# Patient Record
Sex: MALE | Employment: OTHER | URBAN - METROPOLITAN AREA
[De-identification: names, ages, dates, MRNs, and addresses within clinical notes are randomized per-mention and may not be internally consistent; named-entity substitution may affect disease eponyms.]

---

## 2023-03-06 PROBLEM — E55.9 VITAMIN D DEFICIENCY: Status: ACTIVE | Noted: 2023-03-06

## 2023-03-06 PROBLEM — B20 AIDS (MULTI): Status: ACTIVE | Noted: 2023-03-06

## 2023-03-06 PROBLEM — H52.203 ASTIGMATISM, BILATERAL: Status: ACTIVE | Noted: 2023-03-06

## 2023-03-06 PROBLEM — B20 HIV DISEASE (MULTI): Status: ACTIVE | Noted: 2023-03-06

## 2023-03-06 PROBLEM — R05.9 COUGH: Status: ACTIVE | Noted: 2023-03-06

## 2023-03-06 PROBLEM — I87.399 CHRONIC VENOUS HYPERTENSION WITH COMPLICATION: Status: ACTIVE | Noted: 2023-03-06

## 2023-03-06 PROBLEM — E66.01 MORBID OBESITY (MULTI): Status: ACTIVE | Noted: 2023-03-06

## 2023-03-06 PROBLEM — J30.9 ALLERGIC RHINITIS: Status: ACTIVE | Noted: 2023-03-06

## 2023-03-06 PROBLEM — J06.9 ACUTE URI: Status: ACTIVE | Noted: 2023-03-06

## 2023-03-06 PROBLEM — K74.60 CIRRHOSIS (MULTI): Status: ACTIVE | Noted: 2023-03-06

## 2023-03-06 PROBLEM — D66: Status: ACTIVE | Noted: 2023-03-06

## 2023-03-06 PROBLEM — M25.062 HEMARTHROSIS OF LEFT KNEE: Status: ACTIVE | Noted: 2023-03-06

## 2023-03-06 PROBLEM — K13.79 MOUTH BLEEDING: Status: ACTIVE | Noted: 2023-03-06

## 2023-03-06 PROBLEM — D69.9 BLEEDING DISORDER (CMS-HCC): Status: ACTIVE | Noted: 2023-03-06

## 2023-03-06 PROBLEM — L84 CALLUS OF FOOT: Status: ACTIVE | Noted: 2023-03-06

## 2023-03-06 PROBLEM — F72 SEVERE MENTAL HANDICAP: Status: ACTIVE | Noted: 2023-03-06

## 2023-03-06 PROBLEM — M25.562 LEFT KNEE PAIN: Status: ACTIVE | Noted: 2023-03-06

## 2023-03-06 PROBLEM — R56.9 CONVULSIONS (MULTI): Status: ACTIVE | Noted: 2023-03-06

## 2023-03-06 PROBLEM — H52.13 BILATERAL MYOPIA: Status: ACTIVE | Noted: 2023-03-06

## 2023-03-06 PROBLEM — M23.42 LOOSE BODY OF LEFT KNEE: Status: ACTIVE | Noted: 2023-03-06

## 2023-03-06 PROBLEM — Z86.39 HISTORY OF OBESITY: Status: ACTIVE | Noted: 2023-03-06

## 2023-03-06 PROBLEM — M19.90 DJD (DEGENERATIVE JOINT DISEASE): Status: ACTIVE | Noted: 2023-03-06

## 2023-03-06 PROBLEM — I10 BENIGN HYPERTENSION: Status: ACTIVE | Noted: 2023-03-06

## 2023-03-06 PROBLEM — H61.22 IMPACTED CERUMEN OF LEFT EAR: Status: ACTIVE | Noted: 2023-03-06

## 2023-03-06 RX ORDER — BICTEGRAVIR SODIUM, EMTRICITABINE, AND TENOFOVIR ALAFENAMIDE FUMARATE 50; 200; 25 MG/1; MG/1; MG/1
1 TABLET ORAL DAILY
COMMUNITY
Start: 2021-04-08 | End: 2024-01-25 | Stop reason: SDUPTHER

## 2023-03-06 RX ORDER — CELECOXIB 100 MG/1
1 CAPSULE ORAL 2 TIMES DAILY
COMMUNITY
Start: 2017-11-28 | End: 2024-01-10 | Stop reason: SDUPTHER

## 2023-03-06 RX ORDER — ACETAMINOPHEN 500 MG
2 TABLET ORAL DAILY
COMMUNITY
Start: 2019-11-21 | End: 2024-01-25 | Stop reason: SDUPTHER

## 2023-03-06 RX ORDER — LEVETIRACETAM 1000 MG/1
1 TABLET ORAL 2 TIMES DAILY
COMMUNITY
Start: 2016-01-06 | End: 2024-01-09

## 2023-03-30 ENCOUNTER — APPOINTMENT (OUTPATIENT)
Dept: PRIMARY CARE | Facility: CLINIC | Age: 43
End: 2023-03-30
Payer: MEDICARE

## 2023-03-30 LAB
ALANINE AMINOTRANSFERASE (SGPT) (U/L) IN SER/PLAS: 22 U/L (ref 10–52)
ALBUMIN (G/DL) IN SER/PLAS: 4 G/DL (ref 3.4–5)
ALKALINE PHOSPHATASE (U/L) IN SER/PLAS: 99 U/L (ref 33–120)
ANION GAP IN SER/PLAS: 13 MMOL/L (ref 10–20)
ASPARTATE AMINOTRANSFERASE (SGOT) (U/L) IN SER/PLAS: 22 U/L (ref 9–39)
BASOPHILS (10*3/UL) IN BLOOD BY AUTOMATED COUNT: 0.02 X10E9/L (ref 0–0.1)
BASOPHILS/100 LEUKOCYTES IN BLOOD BY AUTOMATED COUNT: 0.6 % (ref 0–2)
BILIRUBIN TOTAL (MG/DL) IN SER/PLAS: 1 MG/DL (ref 0–1.2)
CALCIDIOL (25 OH VITAMIN D3) (NG/ML) IN SER/PLAS: 64 NG/ML
CALCIUM (MG/DL) IN SER/PLAS: 9 MG/DL (ref 8.6–10.6)
CARBON DIOXIDE, TOTAL (MMOL/L) IN SER/PLAS: 26 MMOL/L (ref 21–32)
CD3+CD4+ ABSOLUTE: 0.32 X10E9/L (ref 0.35–2.74)
CD3+CD8+ ABSOLUTE: 0.29 X10E9/L (ref 0.08–1.49)
CD4/CD8 RATIO: 1.08 (ref 1–3.5)
CD45%: 100 %
CHLORIDE (MMOL/L) IN SER/PLAS: 105 MMOL/L (ref 98–107)
CP CD3+CD4+%: 27 % (ref 29–57)
CP CD3+CD8+%: 25 % (ref 7–31)
CREATININE (MG/DL) IN SER/PLAS: 0.99 MG/DL (ref 0.5–1.3)
EOSINOPHILS (10*3/UL) IN BLOOD BY AUTOMATED COUNT: 0.05 X10E9/L (ref 0–0.7)
EOSINOPHILS/100 LEUKOCYTES IN BLOOD BY AUTOMATED COUNT: 1.4 % (ref 0–6)
ERYTHROCYTE DISTRIBUTION WIDTH (RATIO) BY AUTOMATED COUNT: 12.4 % (ref 11.5–14.5)
ERYTHROCYTE MEAN CORPUSCULAR HEMOGLOBIN CONCENTRATION (G/DL) BY AUTOMATED: 34.3 G/DL (ref 32–36)
ERYTHROCYTE MEAN CORPUSCULAR VOLUME (FL) BY AUTOMATED COUNT: 91 FL (ref 80–100)
ERYTHROCYTES (10*6/UL) IN BLOOD BY AUTOMATED COUNT: 4.96 X10E12/L (ref 4.5–5.9)
FMETH: ABNORMAL
FSIT1: ABNORMAL
GFR MALE: >90 ML/MIN/1.73M2
GLUCOSE (MG/DL) IN SER/PLAS: 84 MG/DL (ref 74–99)
HEMATOCRIT (%) IN BLOOD BY AUTOMATED COUNT: 45.2 % (ref 41–52)
HEMOGLOBIN (G/DL) IN BLOOD: 15.5 G/DL (ref 13.5–17.5)
IMMATURE GRANULOCYTES/100 LEUKOCYTES IN BLOOD BY AUTOMATED COUNT: 0 % (ref 0–0.9)
LEUKOCYTES (10*3/UL) IN BLOOD BY AUTOMATED COUNT: 3.5 X10E9/L (ref 4.4–11.3)
LYMPHOCYTES (10*3/UL) IN BLOOD BY AUTOMATED COUNT: 1.18 X10E9/L (ref 1.2–4.8)
LYMPHOCYTES/100 LEUKOCYTES IN BLOOD BY AUTOMATED COUNT: 33.4 % (ref 13–44)
MONOCYTES (10*3/UL) IN BLOOD BY AUTOMATED COUNT: 0.75 X10E9/L (ref 0.1–1)
MONOCYTES/100 LEUKOCYTES IN BLOOD BY AUTOMATED COUNT: 21.2 % (ref 2–10)
NEUTROPHILS (10*3/UL) IN BLOOD BY AUTOMATED COUNT: 1.53 X10E9/L (ref 1.2–7.7)
NEUTROPHILS/100 LEUKOCYTES IN BLOOD BY AUTOMATED COUNT: 43.4 % (ref 40–80)
NRBC (PER 100 WBCS) BY AUTOMATED COUNT: 0 /100 WBC (ref 0–0)
PLATELETS (10*3/UL) IN BLOOD AUTOMATED COUNT: 122 X10E9/L (ref 150–450)
POTASSIUM (MMOL/L) IN SER/PLAS: 4.1 MMOL/L (ref 3.5–5.3)
PROTEIN TOTAL: 7.4 G/DL (ref 6.4–8.2)
SODIUM (MMOL/L) IN SER/PLAS: 140 MMOL/L (ref 136–145)
UREA NITROGEN (MG/DL) IN SER/PLAS: 9 MG/DL (ref 6–23)

## 2023-03-31 LAB
HIV-1 RNA PCR VIRAL LOAD LOG: NORMAL LOG10 CPY/ML
HIV-1 RNA VIRAL LOAD: NOT DETECTED COPIES/ML

## 2023-04-25 ENCOUNTER — OFFICE VISIT (OUTPATIENT)
Dept: PRIMARY CARE | Facility: CLINIC | Age: 43
End: 2023-04-25
Payer: MEDICARE

## 2023-04-25 VITALS
HEART RATE: 92 BPM | HEIGHT: 75 IN | TEMPERATURE: 95.6 F | SYSTOLIC BLOOD PRESSURE: 111 MMHG | BODY MASS INDEX: 39.17 KG/M2 | WEIGHT: 315 LBS | OXYGEN SATURATION: 97 % | DIASTOLIC BLOOD PRESSURE: 78 MMHG

## 2023-04-25 DIAGNOSIS — Z11.1 PPD SCREENING TEST: Primary | ICD-10-CM

## 2023-04-25 DIAGNOSIS — F79 INTELLECTUAL DISABILITY: ICD-10-CM

## 2023-04-25 PROCEDURE — 3074F SYST BP LT 130 MM HG: CPT | Performed by: STUDENT IN AN ORGANIZED HEALTH CARE EDUCATION/TRAINING PROGRAM

## 2023-04-25 PROCEDURE — 1036F TOBACCO NON-USER: CPT | Performed by: STUDENT IN AN ORGANIZED HEALTH CARE EDUCATION/TRAINING PROGRAM

## 2023-04-25 PROCEDURE — 3078F DIAST BP <80 MM HG: CPT | Performed by: STUDENT IN AN ORGANIZED HEALTH CARE EDUCATION/TRAINING PROGRAM

## 2023-04-25 PROCEDURE — 99213 OFFICE O/P EST LOW 20 MIN: CPT | Performed by: STUDENT IN AN ORGANIZED HEALTH CARE EDUCATION/TRAINING PROGRAM

## 2023-04-25 ASSESSMENT — PAIN SCALES - GENERAL: PAINLEVEL: 0-NO PAIN

## 2023-04-25 NOTE — PROGRESS NOTES
"Subjective   Patient ID: Cuong Peña is a 42 y.o. male who presents for Follow-up (Patients mother and guardian said they are here for a paper work to be filled up.).    HPI     #PPD Test  -Pt is here for a physical evaluation for guardiaship, Tdap UTD, Last PPD in 2019, asking for PPD test, pt asymptomatic    #Mental evaluation  -Pt is on the medications listed in the EMR, paperwork is for his guardian to be continued in his courtship, is asking for a detailed mental evaluation of the patient and is asking about judgement and affect as well as documenting his mental condition,     Review of Systems    Pt denies any current chest pain, SOB, nausea, vomiting, abdominal pain    Objective   /78 (BP Location: Right arm, Patient Position: Sitting)   Pulse 92   Temp 35.3 °C (95.6 °F)   Ht 1.905 m (6' 3\")   Wt (!) 156 kg (345 lb)   SpO2 97%   BMI 43.12 kg/m²     Physical Exam    Constitutional: Well developed, awake, alert, oriented x3  Head and Face: NCAT  Eyes: Normal external exam, clear sclera bl  ENT: Normal external inspection of ears and nose. Oropharynx normal.  Cardiovascular: RRR, S1/S2, no murmurs, rubs, or gallops, radial pulses +2, no edema of extremities  Pulmonary: CTAB, no respiratory distress.  Abdomen: +BS, soft, non-tender, nondistended, no guarding or rebound, no masses noted  Neuro: A&O x3, CN II-XII grossly intact, no focal neuro deficits   MSK: No joint swelling, normal movements of all extremities. Range of motion- normal.  Skin- No lesions, contusions, or erythema.  Psychiatric: Judgment intact. Appropriate mood and behavior   : no abnormalities     Assessment/Plan   Diagnoses and all orders for this visit:  PPD screening test  -Ordered, pt refusing, this was documented in Physical exam form that was filled out for patient, noted that it was not UTD, TDAP UTD not due at this time   Intellectual disability  -     Referral to Psychiatry; Future  -     Referral to Psychology; " Future  -Psych referral for indepth mental evaluation/paperwork completion  Other orders  -     TB Skin Test      Discussed with: Dr. Johnson  Return in : 2 months for  visit     Portions of this note were generated using digital voice recognition software, and may contain grammatical errors       Silas Michaels MD  PGY-3, Family Medicine

## 2023-04-25 NOTE — PROGRESS NOTES
I reviewed with the resident the medical history and the resident’s findings on physical examination.  I discussed with the resident the patient’s diagnosis and concur with the treatment plan as documented in the resident note.   PPD required by facility.   Matt Johnson MD

## 2023-09-26 PROBLEM — B18.2 CHRONIC HEPATITIS C VIRUS INFECTION (MULTI): Status: ACTIVE | Noted: 2023-09-26

## 2023-09-26 PROBLEM — Z59.41 FOOD INSECURITY: Status: ACTIVE | Noted: 2023-09-26

## 2023-09-29 LAB
ALANINE AMINOTRANSFERASE (SGPT) (U/L) IN SER/PLAS: 19 U/L (ref 10–52)
ALBUMIN (G/DL) IN SER/PLAS: 4 G/DL (ref 3.4–5)
ALKALINE PHOSPHATASE (U/L) IN SER/PLAS: 96 U/L (ref 33–120)
ANION GAP IN SER/PLAS: 13 MMOL/L (ref 10–20)
ASPARTATE AMINOTRANSFERASE (SGOT) (U/L) IN SER/PLAS: 20 U/L (ref 9–39)
BASOPHILS (10*3/UL) IN BLOOD BY AUTOMATED COUNT: 0.02 X10E9/L (ref 0–0.1)
BASOPHILS/100 LEUKOCYTES IN BLOOD BY AUTOMATED COUNT: 0.5 % (ref 0–2)
BILIRUBIN TOTAL (MG/DL) IN SER/PLAS: 1 MG/DL (ref 0–1.2)
CALCIUM (MG/DL) IN SER/PLAS: 9 MG/DL (ref 8.6–10.6)
CARBON DIOXIDE, TOTAL (MMOL/L) IN SER/PLAS: 24 MMOL/L (ref 21–32)
CHLAMYDIA TRACH., AMPLIFIED: NEGATIVE
CHLORIDE (MMOL/L) IN SER/PLAS: 106 MMOL/L (ref 98–107)
CHOLESTEROL (MG/DL) IN SER/PLAS: 160 MG/DL (ref 0–199)
CHOLESTEROL IN HDL (MG/DL) IN SER/PLAS: 48.8 MG/DL
CHOLESTEROL/HDL RATIO: 3.3
CREATININE (MG/DL) IN SER/PLAS: 1.14 MG/DL (ref 0.5–1.3)
EOSINOPHILS (10*3/UL) IN BLOOD BY AUTOMATED COUNT: 0.05 X10E9/L (ref 0–0.7)
EOSINOPHILS/100 LEUKOCYTES IN BLOOD BY AUTOMATED COUNT: 1.2 % (ref 0–6)
ERYTHROCYTE DISTRIBUTION WIDTH (RATIO) BY AUTOMATED COUNT: 12.1 % (ref 11.5–14.5)
ERYTHROCYTE MEAN CORPUSCULAR HEMOGLOBIN CONCENTRATION (G/DL) BY AUTOMATED: 36.4 G/DL (ref 32–36)
ERYTHROCYTE MEAN CORPUSCULAR VOLUME (FL) BY AUTOMATED COUNT: 91 FL (ref 80–100)
ERYTHROCYTES (10*6/UL) IN BLOOD BY AUTOMATED COUNT: 4.64 X10E12/L (ref 4.5–5.9)
GFR MALE: 81 ML/MIN/1.73M2
GLUCOSE (MG/DL) IN SER/PLAS: 84 MG/DL (ref 74–99)
HEMATOCRIT (%) IN BLOOD BY AUTOMATED COUNT: 42 % (ref 41–52)
HEMOGLOBIN (G/DL) IN BLOOD: 15.3 G/DL (ref 13.5–17.5)
IMMATURE GRANULOCYTES/100 LEUKOCYTES IN BLOOD BY AUTOMATED COUNT: 0.2 % (ref 0–0.9)
KEPPRA: 28 UG/ML (ref 10–40)
LDL: 101 MG/DL (ref 0–99)
LEUKOCYTES (10*3/UL) IN BLOOD BY AUTOMATED COUNT: 4 X10E9/L (ref 4.4–11.3)
LYMPHOCYTES (10*3/UL) IN BLOOD BY AUTOMATED COUNT: 1.45 X10E9/L (ref 1.2–4.8)
LYMPHOCYTES/100 LEUKOCYTES IN BLOOD BY AUTOMATED COUNT: 36.1 % (ref 13–44)
MONOCYTES (10*3/UL) IN BLOOD BY AUTOMATED COUNT: 0.7 X10E9/L (ref 0.1–1)
MONOCYTES/100 LEUKOCYTES IN BLOOD BY AUTOMATED COUNT: 17.4 % (ref 2–10)
N. GONORRHEA, AMPLIFIED: NEGATIVE
NEUTROPHILS (10*3/UL) IN BLOOD BY AUTOMATED COUNT: 1.79 X10E9/L (ref 1.2–7.7)
NEUTROPHILS/100 LEUKOCYTES IN BLOOD BY AUTOMATED COUNT: 44.6 % (ref 40–80)
NRBC (PER 100 WBCS) BY AUTOMATED COUNT: 0 /100 WBC (ref 0–0)
PLATELETS (10*3/UL) IN BLOOD AUTOMATED COUNT: 150 X10E9/L (ref 150–450)
POTASSIUM (MMOL/L) IN SER/PLAS: 4.1 MMOL/L (ref 3.5–5.3)
PROTEIN TOTAL: 7.5 G/DL (ref 6.4–8.2)
SODIUM (MMOL/L) IN SER/PLAS: 139 MMOL/L (ref 136–145)
SYPHILIS TOTAL AB: NONREACTIVE
TRIGLYCERIDE (MG/DL) IN SER/PLAS: 53 MG/DL (ref 0–149)
UREA NITROGEN (MG/DL) IN SER/PLAS: 9 MG/DL (ref 6–23)
VLDL: 11 MG/DL (ref 0–40)

## 2023-09-30 LAB
CD3+CD4+ ABSOLUTE: 0.41 X10E9/L (ref 0.35–2.74)
CD3+CD8+ ABSOLUTE: 0.36 X10E9/L (ref 0.08–1.49)
CD4/CD8 RATIO: 1.12 (ref 1–3.5)
CD45%: 100 %
CP CD3+CD4+%: 28 % (ref 29–57)
CP CD3+CD8+%: 25 % (ref 7–31)
FMETH: ABNORMAL
FSIT1: ABNORMAL
HIV-1 RNA PCR VIRAL LOAD LOG: NORMAL LOG10 CPY/ML
HIV-1 RNA VIRAL LOAD: NOT DETECTED COPIES/ML

## 2023-10-13 ENCOUNTER — TELEPHONE (OUTPATIENT)
Dept: PEDIATRIC HEMATOLOGY/ONCOLOGY | Facility: HOSPITAL | Age: 43
End: 2023-10-13
Payer: MEDICARE

## 2023-10-31 ENCOUNTER — DOCUMENTATION (OUTPATIENT)
Dept: IMMUNOLOGY | Facility: CLINIC | Age: 43
End: 2023-10-31
Payer: MEDICARE

## 2023-10-31 NOTE — PROGRESS NOTES
10/31/2023; Per Frieda Peña, (Mike's mom), request, Mike's Immunization Record were mailed to her.    CARTER Hanson RN

## 2023-11-29 DIAGNOSIS — D66 HEMOPHILIA A (MULTI): Primary | ICD-10-CM

## 2023-11-30 RX ORDER — EMICIZUMAB 150 MG/ML
1.5 INJECTION, SOLUTION SUBCUTANEOUS
Qty: 8 ML | Refills: 2 | Status: SHIPPED | OUTPATIENT
Start: 2023-11-30 | End: 2024-03-01 | Stop reason: SDUPTHER

## 2023-12-21 ENCOUNTER — HOSPITAL ENCOUNTER (OUTPATIENT)
Dept: RADIOLOGY | Facility: HOSPITAL | Age: 43
Discharge: HOME | End: 2023-12-21
Payer: MEDICARE

## 2023-12-21 DIAGNOSIS — K74.60 UNSPECIFIED CIRRHOSIS OF LIVER (MULTI): ICD-10-CM

## 2023-12-21 PROCEDURE — 76705 ECHO EXAM OF ABDOMEN: CPT | Performed by: RADIOLOGY

## 2023-12-21 PROCEDURE — 76705 ECHO EXAM OF ABDOMEN: CPT

## 2024-01-03 DIAGNOSIS — R56.9 SEIZURE (MULTI): Primary | ICD-10-CM

## 2024-01-09 DIAGNOSIS — R56.9 SEIZURE (MULTI): ICD-10-CM

## 2024-01-09 RX ORDER — LEVETIRACETAM 1000 MG/1
1000 TABLET ORAL 2 TIMES DAILY
Qty: 60 TABLET | Refills: 10 | Status: SHIPPED | OUTPATIENT
Start: 2024-01-09 | End: 2024-01-10

## 2024-01-10 ENCOUNTER — TELEPHONE (OUTPATIENT)
Dept: ADMISSION | Facility: HOSPITAL | Age: 44
End: 2024-01-10
Payer: MEDICARE

## 2024-01-10 DIAGNOSIS — D66 HEMOPHILIC ARTHROPATHY (MULTI): Primary | ICD-10-CM

## 2024-01-10 DIAGNOSIS — M36.2 HEMOPHILIC ARTHROPATHY (MULTI): Primary | ICD-10-CM

## 2024-01-10 RX ORDER — LEVETIRACETAM 1000 MG/1
1000 TABLET ORAL 2 TIMES DAILY
Qty: 60 TABLET | Refills: 10 | Status: SHIPPED | OUTPATIENT
Start: 2024-01-10 | End: 2024-01-17 | Stop reason: SDUPTHER

## 2024-01-11 RX ORDER — CELECOXIB 100 MG/1
100 CAPSULE ORAL 2 TIMES DAILY
Qty: 180 CAPSULE | Refills: 3 | Status: SHIPPED | OUTPATIENT
Start: 2024-01-11

## 2024-01-17 ENCOUNTER — OFFICE VISIT (OUTPATIENT)
Dept: NEUROLOGY | Facility: CLINIC | Age: 44
End: 2024-01-17
Payer: MEDICARE

## 2024-01-17 VITALS
BODY MASS INDEX: 40.43 KG/M2 | SYSTOLIC BLOOD PRESSURE: 122 MMHG | WEIGHT: 315 LBS | DIASTOLIC BLOOD PRESSURE: 84 MMHG | HEART RATE: 80 BPM | HEIGHT: 74 IN | RESPIRATION RATE: 18 BRPM

## 2024-01-17 DIAGNOSIS — R56.9 SEIZURE (MULTI): ICD-10-CM

## 2024-01-17 DIAGNOSIS — K74.60 HEPATIC CIRRHOSIS, UNSPECIFIED HEPATIC CIRRHOSIS TYPE, UNSPECIFIED WHETHER ASCITES PRESENT (MULTI): ICD-10-CM

## 2024-01-17 DIAGNOSIS — B20 AIDS (MULTI): ICD-10-CM

## 2024-01-17 DIAGNOSIS — D66 CONGENITAL FACTOR VIII DISORDER (MULTI): ICD-10-CM

## 2024-01-17 DIAGNOSIS — E66.01 MORBID OBESITY (MULTI): ICD-10-CM

## 2024-01-17 PROCEDURE — 3079F DIAST BP 80-89 MM HG: CPT | Performed by: NURSE PRACTITIONER

## 2024-01-17 PROCEDURE — 99214 OFFICE O/P EST MOD 30 MIN: CPT | Performed by: NURSE PRACTITIONER

## 2024-01-17 PROCEDURE — 1036F TOBACCO NON-USER: CPT | Performed by: NURSE PRACTITIONER

## 2024-01-17 PROCEDURE — 3074F SYST BP LT 130 MM HG: CPT | Performed by: NURSE PRACTITIONER

## 2024-01-17 RX ORDER — LEVETIRACETAM 1000 MG/1
1000 TABLET ORAL 2 TIMES DAILY
Qty: 60 TABLET | Refills: 11 | Status: SHIPPED | OUTPATIENT
Start: 2024-01-17 | End: 2025-01-16

## 2024-01-17 ASSESSMENT — PAIN SCALES - GENERAL: PAINLEVEL: 0-NO PAIN

## 2024-01-17 NOTE — PROGRESS NOTES
Patient ID: Cuong Peña 43 y.o.male presenting in follow-up for epilepsy.     HPI    Epileptic Paroxysmal Episodes   EZ: unknown   Semiology: Generalized tonic clonic sz   Frequency: Last one about 10+ year ago   Etiology: Unknown   Comorbidities: Developmental delay, HIV, Hep C   -serum level 06/2022-26 keppra       First seizure around 1982 when he was about 3 years old. Mom described this as a generalized tonic clonic seizure where he bit his tongue, had urinary incontinence and slept a couple of hours afterward. He was started on medication phenobarbital then switched to some other agents at some point which she does not remember. He has been on Oxcarbazepine for many years until 2015. His HIV doctor had wished to switch him to another medication in order to have less interactions with his HIV medications. He was admitted to the EMU in 2015 where his EEG was normal and the Trileptal was stopped and he was switched to Keppra 1000 mg BID.      Past Medical History:   HIV   Hep C   Epilepsy   Hemophilia   Developmental Delay      Social History:   Lives at home with mother. Goes to a workshop. No driving. No tobacco, alcohol, or illicit drug use      Family History:   No known history of epilepsy   Patient states they do always remember to take their seizure medication(s)   Levetiracetam (Keppra)   Phenobarbital       PRESENT CONCERNS:  He has been seizure free for at least 10+ years per his mother. He has been tolerating the Keppra 1000mg well without any side effects. No concerns for mood or behaviors. Attends an adult workshop. No new medications or recent illnesses. Sleep is adequate no issues falling or staying asleep. Mood is stable.          Review of Systems   All other systems reviewed and are negative.        CONTROLLED SUBSTANCE  N/A      RESULTS:  EEG:  No EEG results found for the past 12 months    EKG:  No results found for this or any previous visit (from the past 4464  hour(s)).    LABS:      IMAGING:  No MRI head results found for the past 12 months    No CT head results found for the past 12 months    No results found for this or any previous visit.    Vitals:  Vitals:    01/17/24 0940   BP: 122/84   Pulse: 80   Resp: 18       PHYSICAL EXAM:  Neurologic Exam   Cognitive delay, able to verbally communicate. Mother answered all questions, patient was able to state his name and answer yes no questions .   Sensory Exam: Normal to light touch.   Coordination: There is no limb dystaxia and rapid alternating movements are intact.   Gait: smooth, symmetric, upright with arm swing    ASSESSMENT & PLAN:   43 y.o. male presenting in follow-up for peviously diagnosed epilepsy. Semiology as described above    Problem List Items Addressed This Visit       AIDS (CMS/HCC)    Cirrhosis (CMS/HCC)    Congenital factor VIII disorder (CMS/HCC)    Morbid obesity (CMS/HCC)    Seizure (CMS/HCC)    Relevant Medications    levETIRAcetam (Keppra) 1,000 mg tablet       41 year old man with history of epilepsy who is very well controlled and has not had any seizures in 10 years. He is tolerating the Keppra well without any side effects, so we will not make any changes at this time.      Epileptic Paroxysmal Episodes   EZ: unknown   Semiology: Generalized tonic clonic sz   Frequency: Last one about 10+ year ago   Etiology: Unknown   Comorbidities: Developmental delay, HIV, Hep C   -serum level 06/2022-26 keppra     Plan:   -Continue Keppra 1000 mg BID   -Follow up in clinic in one year. Call sooner if there are any other issues   - Given my contact information ([phone]). instructed to call in the event of breakthrough seizure, medication refills, or any questions

## 2024-01-17 NOTE — PATIENT INSTRUCTIONS
"Thank you for coming to the Epilepsy Clinic today.    -If you have any sudden new, concerning or worsening symptoms, call 911 and go to the Emergency Room. Otherwise, it was good seeing you today-  -  Your seizures are well controlled on the current medication. Additional prescription refills was sent to the pharmacy.    As we discussed, because you have not had any events or seizures where you lose awareness or control of your actions you have no restrictions at this time. However, if you have an event of seizure where this were to occur, you must inform our office, and we will reassess things. In this event, you stop driving, using heavy machinery, climbing heights, or engaging in any other activity that would cause harm/death to yourself or others were you to lose awareness/consciousness and have a seizure. These restrictions would need to stay in place until at least 6 months of seizure freedom and clearance your physician.    -HOW TO CONTACT MONICA FIERRO EPILEPSY NURSE PRACTITIONER ([phone]).   Instructed to call in the event of seizure, medication refills, or any questions  *Please allow 24-48 hours for non-urgent responses*.  For emergency concerns, please dial 911 or present to the nearest emergency room.  For concerns after business hours (8am-4:30pm) or on weekends please call [phone]  To call and schedule a follow up appointment please call [phone]  -Paperwork may take up to 3 business days to complete-    Every attempt is made to run on time for your appointment, if you are 15 minutes or later for your appoinement you may be asked to reschedule    -Compliance education: It is important to continue to try and achieve seizure control because of the potential for injury and illness due to seizures. In a very small minority of patients with generalized tonic clonic seizures (\"grand mal\"), breathing or heart function can stop during a seizure and result in demise (sudden unexpected death in " epilepsy or SUDEP). Freedom from seizures prevents this kind of outcome-     Please continue taking levetiracetam (Keppra) 1000mg twice a day.  At higher doses, some people experience drowsiness or irritability. Not something that I'd expect on your dose, but let me know if this is something you experience.

## 2024-01-25 ENCOUNTER — HOSPITAL ENCOUNTER (OUTPATIENT)
Dept: RADIOLOGY | Facility: HOSPITAL | Age: 44
Discharge: HOME | End: 2024-01-25
Payer: MEDICARE

## 2024-01-25 ENCOUNTER — OFFICE VISIT (OUTPATIENT)
Dept: PRIMARY CARE | Facility: CLINIC | Age: 44
End: 2024-01-25
Payer: MEDICARE

## 2024-01-25 ENCOUNTER — OFFICE VISIT (OUTPATIENT)
Dept: IMMUNOLOGY | Facility: CLINIC | Age: 44
End: 2024-01-25
Payer: MEDICARE

## 2024-01-25 ENCOUNTER — PHARMACY VISIT (OUTPATIENT)
Dept: PHARMACY | Facility: CLINIC | Age: 44
End: 2024-01-25
Payer: COMMERCIAL

## 2024-01-25 VITALS
OXYGEN SATURATION: 97 % | BODY MASS INDEX: 39.17 KG/M2 | DIASTOLIC BLOOD PRESSURE: 91 MMHG | TEMPERATURE: 97.9 F | WEIGHT: 315 LBS | SYSTOLIC BLOOD PRESSURE: 137 MMHG | HEART RATE: 84 BPM | HEIGHT: 75 IN | RESPIRATION RATE: 16 BRPM

## 2024-01-25 VITALS
DIASTOLIC BLOOD PRESSURE: 89 MMHG | HEART RATE: 74 BPM | BODY MASS INDEX: 39.17 KG/M2 | WEIGHT: 315 LBS | SYSTOLIC BLOOD PRESSURE: 120 MMHG | TEMPERATURE: 97 F | OXYGEN SATURATION: 96 % | HEIGHT: 75 IN

## 2024-01-25 DIAGNOSIS — R05.2 SUBACUTE COUGH: ICD-10-CM

## 2024-01-25 DIAGNOSIS — E55.9 VITAMIN D DEFICIENCY: ICD-10-CM

## 2024-01-25 DIAGNOSIS — B20 HIV INFECTION, UNSPECIFIED SYMPTOM STATUS (MULTI): ICD-10-CM

## 2024-01-25 DIAGNOSIS — K74.69 OTHER CIRRHOSIS OF LIVER (MULTI): ICD-10-CM

## 2024-01-25 DIAGNOSIS — R05.8 PRODUCTIVE COUGH: Primary | ICD-10-CM

## 2024-01-25 DIAGNOSIS — B20 AIDS (MULTI): Primary | ICD-10-CM

## 2024-01-25 DIAGNOSIS — E66.01 MORBID OBESITY (MULTI): ICD-10-CM

## 2024-01-25 DIAGNOSIS — H61.23 EXCESSIVE CERUMEN IN BOTH EAR CANALS: ICD-10-CM

## 2024-01-25 DIAGNOSIS — B20 AIDS (MULTI): ICD-10-CM

## 2024-01-25 DIAGNOSIS — R05.2 SUBACUTE COUGH: Primary | ICD-10-CM

## 2024-01-25 DIAGNOSIS — I10 BENIGN HYPERTENSION: ICD-10-CM

## 2024-01-25 DIAGNOSIS — B20 HUMAN IMMUNODEFICIENCY VIRUS (MULTI): Primary | ICD-10-CM

## 2024-01-25 LAB
AFP SERPL-MCNC: <4 NG/ML (ref 0–9)
ALBUMIN SERPL BCP-MCNC: 3.7 G/DL (ref 3.4–5)
ALP SERPL-CCNC: 87 U/L (ref 33–120)
ALT SERPL W P-5'-P-CCNC: 15 U/L (ref 10–52)
ANION GAP SERPL CALC-SCNC: 10 MMOL/L (ref 10–20)
AST SERPL W P-5'-P-CCNC: 18 U/L (ref 9–39)
BASOPHILS # BLD AUTO: 0.02 X10*3/UL (ref 0–0.1)
BASOPHILS NFR BLD AUTO: 0.6 %
BILIRUB SERPL-MCNC: 1 MG/DL (ref 0–1.2)
BUN SERPL-MCNC: 9 MG/DL (ref 6–23)
CALCIUM SERPL-MCNC: 9.1 MG/DL (ref 8.6–10.6)
CD3+CD4+ CELLS # BLD: 0.36 X10E9/L
CD3+CD4+ CELLS # BLD: 365 /MM3
CD3+CD4+ CELLS NFR BLD: 29 %
CD3+CD4+ CELLS/CD3+CD8+ CLL BLD: 0.97 %
CD3+CD8+ CELLS # BLD: 0.38 X10E9/L
CD3+CD8+ CELLS NFR BLD: 30 %
CHLORIDE SERPL-SCNC: 103 MMOL/L (ref 98–107)
CO2 SERPL-SCNC: 31 MMOL/L (ref 21–32)
CREAT SERPL-MCNC: 1.2 MG/DL (ref 0.5–1.3)
EGFRCR SERPLBLD CKD-EPI 2021: 76 ML/MIN/1.73M*2
EOSINOPHIL # BLD AUTO: 0.05 X10*3/UL (ref 0–0.7)
EOSINOPHIL NFR BLD AUTO: 1.4 %
ERYTHROCYTE [DISTWIDTH] IN BLOOD BY AUTOMATED COUNT: 12 % (ref 11.5–14.5)
GLUCOSE SERPL-MCNC: 75 MG/DL (ref 74–99)
HCT VFR BLD AUTO: 43.5 % (ref 41–52)
HGB BLD-MCNC: 15.1 G/DL (ref 13.5–17.5)
IMM GRANULOCYTES # BLD AUTO: 0.01 X10*3/UL (ref 0–0.7)
IMM GRANULOCYTES NFR BLD AUTO: 0.3 % (ref 0–0.9)
LYMPHOCYTES # BLD AUTO: 1.26 X10*3/UL (ref 1.2–4.8)
LYMPHOCYTES # SPEC AUTO: 1.26 X10*3/UL
LYMPHOCYTES NFR BLD AUTO: 34.9 %
MCH RBC QN AUTO: 31.6 PG (ref 26–34)
MCHC RBC AUTO-ENTMCNC: 34.7 G/DL (ref 32–36)
MCV RBC AUTO: 91 FL (ref 80–100)
MONOCYTES # BLD AUTO: 0.74 X10*3/UL (ref 0.1–1)
MONOCYTES NFR BLD AUTO: 20.5 %
NEUTROPHILS # BLD AUTO: 1.53 X10*3/UL (ref 1.2–7.7)
NEUTROPHILS NFR BLD AUTO: 42.3 %
NRBC BLD-RTO: 0 /100 WBCS (ref 0–0)
PLATELET # BLD AUTO: 141 X10*3/UL (ref 150–450)
POTASSIUM SERPL-SCNC: 4.2 MMOL/L (ref 3.5–5.3)
PROT SERPL-MCNC: 7.6 G/DL (ref 6.4–8.2)
RBC # BLD AUTO: 4.78 X10*6/UL (ref 4.5–5.9)
SODIUM SERPL-SCNC: 140 MMOL/L (ref 136–145)
WBC # BLD AUTO: 3.6 X10*3/UL (ref 4.4–11.3)

## 2024-01-25 PROCEDURE — 71046 X-RAY EXAM CHEST 2 VIEWS: CPT

## 2024-01-25 PROCEDURE — 71046 X-RAY EXAM CHEST 2 VIEWS: CPT | Performed by: RADIOLOGY

## 2024-01-25 PROCEDURE — 1036F TOBACCO NON-USER: CPT

## 2024-01-25 PROCEDURE — 80053 COMPREHEN METABOLIC PANEL: CPT | Performed by: INTERNAL MEDICINE

## 2024-01-25 PROCEDURE — 3074F SYST BP LT 130 MM HG: CPT

## 2024-01-25 PROCEDURE — 99215 OFFICE O/P EST HI 40 MIN: CPT | Performed by: INTERNAL MEDICINE

## 2024-01-25 PROCEDURE — 88185 FLOWCYTOMETRY/TC ADD-ON: CPT | Mod: TC | Performed by: INTERNAL MEDICINE

## 2024-01-25 PROCEDURE — 99214 OFFICE O/P EST MOD 30 MIN: CPT

## 2024-01-25 PROCEDURE — 85025 COMPLETE CBC W/AUTO DIFF WBC: CPT | Performed by: INTERNAL MEDICINE

## 2024-01-25 PROCEDURE — 82105 ALPHA-FETOPROTEIN SERUM: CPT | Performed by: INTERNAL MEDICINE

## 2024-01-25 PROCEDURE — 87536 HIV-1 QUANT&REVRSE TRNSCRPJ: CPT | Performed by: INTERNAL MEDICINE

## 2024-01-25 PROCEDURE — RXMED WILLOW AMBULATORY MEDICATION CHARGE

## 2024-01-25 PROCEDURE — 3075F SYST BP GE 130 - 139MM HG: CPT | Performed by: INTERNAL MEDICINE

## 2024-01-25 PROCEDURE — 1036F TOBACCO NON-USER: CPT | Performed by: INTERNAL MEDICINE

## 2024-01-25 PROCEDURE — 3079F DIAST BP 80-89 MM HG: CPT

## 2024-01-25 PROCEDURE — 36415 COLL VENOUS BLD VENIPUNCTURE: CPT | Performed by: INTERNAL MEDICINE

## 2024-01-25 PROCEDURE — 3080F DIAST BP >= 90 MM HG: CPT | Performed by: INTERNAL MEDICINE

## 2024-01-25 RX ORDER — ACETAMINOPHEN 500 MG
4000 TABLET ORAL DAILY
Qty: 60 CAPSULE | Refills: 5 | Status: SHIPPED | OUTPATIENT
Start: 2024-01-25 | End: 2024-02-24

## 2024-01-25 RX ORDER — BICTEGRAVIR SODIUM, EMTRICITABINE, AND TENOFOVIR ALAFENAMIDE FUMARATE 50; 200; 25 MG/1; MG/1; MG/1
1 TABLET ORAL DAILY
Qty: 30 TABLET | Refills: 5 | Status: SHIPPED | OUTPATIENT
Start: 2024-01-25 | End: 2024-02-24

## 2024-01-25 ASSESSMENT — ENCOUNTER SYMPTOMS
COUGH: 1
COLOR CHANGE: 0
CONFUSION: 0
CONSTIPATION: 0
CONSTIPATION: 0
DIARRHEA: 0
EYE DISCHARGE: 0
DIAPHORESIS: 0
BRUISES/BLEEDS EASILY: 0
FEVER: 0
SEIZURES: 0
SHORTNESS OF BREATH: 0
PHOTOPHOBIA: 0
ABDOMINAL DISTENTION: 0
UNEXPECTED WEIGHT CHANGE: 0
ARTHRALGIAS: 0
SINUS PAIN: 0
NECK PAIN: 0
ABDOMINAL PAIN: 0
CHOKING: 0
HALLUCINATIONS: 0
DIAPHORESIS: 0
ABDOMINAL PAIN: 0
EYES NEGATIVE: 1
FEVER: 0
WHEEZING: 0
CHILLS: 0
DYSPHORIC MOOD: 0
BACK PAIN: 0
ANAL BLEEDING: 0
NEUROLOGICAL NEGATIVE: 1
WHEEZING: 1
CONSTITUTIONAL NEGATIVE: 1
FREQUENCY: 0
DIZZINESS: 0
DYSURIA: 0
COUGH: 0
APPETITE CHANGE: 0
NAUSEA: 0
SPEECH DIFFICULTY: 0
FATIGUE: 0
BLOOD IN STOOL: 0
NUMBNESS: 0
SLEEP DISTURBANCE: 0
ALLERGIC/IMMUNOLOGIC NEGATIVE: 1
CHEST TIGHTNESS: 0
HEMATOLOGIC/LYMPHATIC NEGATIVE: 1
SINUS PRESSURE: 0
NERVOUS/ANXIOUS: 0
HEADACHES: 0
RHINORRHEA: 0
ENDOCRINE NEGATIVE: 1
ACTIVITY CHANGE: 0
FLANK PAIN: 0
DECREASED CONCENTRATION: 0
MUSCULOSKELETAL NEGATIVE: 1
WOUND: 0
EYE REDNESS: 0
NAUSEA: 0
GASTROINTESTINAL NEGATIVE: 1
EYE PAIN: 0
SORE THROAT: 0
TROUBLE SWALLOWING: 0
VOMITING: 0
TREMORS: 0
LIGHT-HEADEDNESS: 0
CHILLS: 0
SORE THROAT: 1
PSYCHIATRIC NEGATIVE: 1
HYPERACTIVE: 0
EYE PAIN: 0
BLOOD IN STOOL: 0
NECK STIFFNESS: 0
APPETITE CHANGE: 0
HEMATURIA: 0
HEADACHES: 0
CARDIOVASCULAR NEGATIVE: 1
RECTAL PAIN: 0
FATIGUE: 0
MYALGIAS: 0
DIFFICULTY URINATING: 0
DIARRHEA: 0
DIZZINESS: 0
SHORTNESS OF BREATH: 0
JOINT SWELLING: 0
ADENOPATHY: 0
VOMITING: 0
EYE ITCHING: 0
NECK PAIN: 0

## 2024-01-25 ASSESSMENT — PAIN SCALES - GENERAL: PAINLEVEL: 0-NO PAIN

## 2024-01-25 NOTE — LETTER
01/25/24    Luis M Michaels MD  [address]  [place]  [facility] [address]      Dear Dr. Luis M Michaels MD,    I am writing to confirm that your patient, Cuong Peña, received care in my office on 01/25/24. I have enclosed a summary of the care provided to Cuong for your reference.    Please contact me with any questions you may have regarding the visit.    Sincerely,         Michael Larson MD  [address]  [facility] [address]  [facility] [address]  [phone]    CC: No Recipients

## 2024-01-25 NOTE — PROGRESS NOTES
HIV Clinic Visit:   Cuong Peña is a 43 y.o. male was last seen in [place] on today.    Missed antiretroviral doses in last 72 hours? No    Sexually active? No Partner/s aware of diagnosis? No    Condom use? No Partner on PrEP? No    Tobacco use: No    SUBJECTIVE: Mr Peña had some some sore throat and cough with mild production of clear sputum last month. The sore throat resolved whereas he still has some cough. No fever, respiratory distress. He has not missed any doses of his medications. No episodes of bleeding. No seizure activity. Recommended that he gets a COVID 19 vaccine,        Review of Systems   Constitutional: Negative.  Negative for appetite change, chills, diaphoresis, fatigue and fever.   HENT: Negative.  Negative for congestion, dental problem, ear discharge, ear pain, hearing loss, mouth sores, nosebleeds, rhinorrhea, sinus pressure, sinus pain, sneezing, sore throat and tinnitus.    Eyes: Negative.  Negative for photophobia, pain, discharge, redness, itching and visual disturbance.   Respiratory:  Positive for cough. Negative for shortness of breath and wheezing.    Cardiovascular: Negative.  Negative for chest pain.   Gastrointestinal: Negative.  Negative for abdominal distention, abdominal pain, anal bleeding, blood in stool, constipation, diarrhea, nausea, rectal pain and vomiting.   Endocrine: Negative.    Genitourinary: Negative.  Negative for difficulty urinating, dysuria, enuresis, flank pain, frequency, genital sores, hematuria and urgency.   Musculoskeletal: Negative.  Negative for arthralgias, back pain, gait problem, joint swelling, myalgias, neck pain and neck stiffness.   Skin: Negative.  Negative for color change, pallor, rash and wound.   Allergic/Immunologic: Negative.    Neurological: Negative.  Negative for dizziness, tremors, seizures, syncope, speech difficulty, light-headedness, numbness and headaches.   Hematological: Negative.  Negative for adenopathy. Does not  "bruise/bleed easily.   Psychiatric/Behavioral: Negative.  Negative for behavioral problems, confusion, decreased concentration, dysphoric mood, hallucinations, self-injury and sleep disturbance. The patient is not nervous/anxious and is not hyperactive.          Objective   Visit Vitals  BP (!) 137/91 (BP Location: Left arm, Patient Position: Sitting, BP Cuff Size: Adult)   Pulse 84   Temp 36.6 °C (97.9 °F) (Skin)   Resp 16   Ht 1.905 m (6' 3\")   Wt (!) 154 kg (339 lb 3.2 oz)   SpO2 97%   BMI 42.40 kg/m²   Smoking Status Never   BSA 2.85 m²        Physical Exam  Constitutional:       Appearance: Normal appearance.   HENT:      Head: Normocephalic and atraumatic.      Comments: Asymmetric area in his chin. Area not tender, erythematous or red.     Right Ear: Tympanic membrane normal.      Left Ear: Tympanic membrane normal.      Nose: Nose normal.      Mouth/Throat:      Mouth: Mucous membranes are moist.      Tongue: No lesions.      Palate: No mass.      Pharynx: Oropharynx is clear. No pharyngeal swelling, oropharyngeal exudate, posterior oropharyngeal erythema or uvula swelling.      Tonsils: No tonsillar exudate.   Eyes:      General: Lids are normal.      Extraocular Movements: Extraocular movements intact.      Conjunctiva/sclera: Conjunctivae normal.      Pupils: Pupils are equal, round, and reactive to light.   Neck:      Thyroid: No thyroid mass or thyroid tenderness.      Vascular: Normal carotid pulses. No carotid bruit or JVD.      Trachea: Trachea normal.   Cardiovascular:      Rate and Rhythm: Normal rate and regular rhythm.      Pulses: Normal pulses.      Heart sounds: Normal heart sounds.   Pulmonary:      Effort: Pulmonary effort is normal.      Breath sounds: Normal breath sounds.   Abdominal:      General: Abdomen is flat. Bowel sounds are normal.      Palpations: Abdomen is soft. There is no hepatomegaly, splenomegaly or mass.      Tenderness: There is no abdominal tenderness.   Musculoskeletal: "         General: No swelling, tenderness, deformity or signs of injury. Normal range of motion.      Cervical back: Full passive range of motion without pain, normal range of motion and neck supple.      Right lower leg: No edema.      Left lower leg: No edema.   Lymphadenopathy:      Cervical: No cervical adenopathy.   Skin:     General: Skin is warm and dry.   Neurological:      General: No focal deficit present.      Mental Status: He is alert and oriented to person, place, and time.   Psychiatric:         Mood and Affect: Mood normal.         CURRENT MEDICATIONS    Current Outpatient Medications:     aminocaproic acid (AMICAR ORAL), Take 25 mg by mouth if needed., Disp: , Rfl:     celecoxib (CeleBREX) 100 mg capsule, Take 1 capsule (100 mg) by mouth 2 times a day., Disp: 180 capsule, Rfl: 3    emicizumab-kxwh (Hemlibra) 150 mg/mL solution, Inject 234 mg under the skin every 7 days. Per allscripts: Start date April 3, 2023 starting after maintenance doses complete for 238.5 milligrams SubQ every 7 days.  Disp 4 doses.  Include ancillary supplies., Disp: 8 mL, Rfl: 2    levETIRAcetam (Keppra) 1,000 mg tablet, Take 1 tablet (1,000 mg) by mouth 2 times a day., Disp: 60 tablet, Rfl: 11    aminocaproic acid (AMICAR ORAL), Take by mouth., Disp: , Rfl:     antihemophilic factor rcmb PEG (Adynovate) 250 (+/-) unit solution, Infuse 9,778 Units into a venous catheter 2 times a week. On Mondays and Thursdays, Disp: , Rfl:     kpvazykap-mqboidpq-wzddzre ala (Biktarvy) -25 mg tablet, Take 1 tablet by mouth once daily., Disp: 30 tablet, Rfl: 5    cholecalciferol (Vitamin D-3) 50 mcg (2,000 unit) capsule, Take 2 capsules (100 mcg) by mouth once daily., Disp: 60 capsule, Rfl: 5       Relevant Results  Labs  Lab Results   Component Value Date    DVD8YITHI NOT DETECTED 09/29/2023    RC4HEV5WVZP 0.363 09/29/2023    VITD25 64 03/30/2023      Lab Results   Component Value Date    WBC 4.0 (L) 09/29/2023    HGB 15.3 09/29/2023     "HCT 42.0 09/29/2023    MCV 91 09/29/2023     09/29/2023      Lab Results   Component Value Date    GLUCOSE 84 09/29/2023    CALCIUM 9.0 09/29/2023     09/29/2023    K 4.1 09/29/2023    CO2 24 09/29/2023     09/29/2023    BUN 9 09/29/2023    CREATININE 1.14 09/29/2023      Lab Results   Component Value Date    CHOL 160 09/29/2023    CHOL 159 10/27/2022    CHOL 165 06/08/2022     Lab Results   Component Value Date    HDL 48.8 09/29/2023    HDL 46.0 10/27/2022    HDL 46.3 06/08/2022     No results found for: \"LDLCALC\"  Lab Results   Component Value Date    TRIG 53 09/29/2023    TRIG 70 10/27/2022    TRIG 48 06/08/2022     No components found for: \"CHOLHDL\"       Assessment/Plan   Problem List Items Addressed This Visit       AIDS (CMS/HCC) - Primary    Current Assessment & Plan     Mr Peña is compliant with ART. His HIV infection has remained under control.         Benign hypertension    Current Assessment & Plan     Mr Peña' blood pressure was elevated today. However, his mother stated that she checks his blood pressure every day that the recordings were normal. We asked that she call the clinic with the recordings.         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 He is being followed by Hepatology, His lat liver US remained unchanged. Alpha-feto protein also normal. He will get a repeat liver US in June. Reminded the mother to get an appointment with Dr Grant.         Relevant Orders    Alpha-Fetoprotein    Morbid obesity (CMS/HCC)    Current Assessment & Plan     The patients's mother has met with Frank Forbes in the past to go over change in the patient's diet. Reemphasized the importance of diet modification.         Vitamin D deficiency    Current Assessment & Plan     Mr Peña is on vitamin D supplementation.          Other Visit Diagnoses       HIV infection, unspecified symptom status (CMS/HCC)        Relevant Orders    CBC and " Auto Differential    CD4/8 Panel    Comprehensive Metabolic Panel    HIV RNA, quantitative, PCR               Michael Larson MD

## 2024-01-25 NOTE — PROGRESS NOTES
Chest xray shows some linear atelectasis in both lower lobes, and bronchial prominence kenny. on lateral view.  No apical infiltrates nor effusion.  IT hasn't been reviewed yet by the radiologist.    I added T-spot to his labs, in view of one month of productive cough and history of HIV infection.

## 2024-01-25 NOTE — ASSESSMENT & PLAN NOTE
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 He is being followed by Hepatology, His lat liver US remained unchanged. Alpha-feto protein also normal. He will get a repeat liver US in June. Reminded the mother to get an appointment with Dr Grant.

## 2024-01-25 NOTE — PROGRESS NOTES
"Subjective   Patient ID: Cuong Peña is a 43 y.o. male who presents for Follow-up (Concerns of coughing up cold, sore throat and hoarness.).    HPI    Mr. Peña presents with complaints of cough. Mr. Peña's mother Ms. Peña (caregiver) is present and provided the history.     #Cough  The cough began in mid-December. It started with thick yellow sputum production with minimal blood present, congestion, sneezing, hoarseness, and some wheezing. Symptoms of fever, chills, night sweats, chest pain, shortness of breath, nose bleeds, runny nose, and unexplained weight loss were all denied. No sick contacts noted. He was not tested for COVID or RSV. Overall improving since start.    He has received 4/5 COVID vaccinations and the flu vaccine.     PAST MEDICAL HISTORY:  - HIV/AIDS (on Biktarvy, last titer 9/2023, viral load not detected)  - chronic hep C  - cirrhosis  - hemophilia A  - epilepsy (on Keppra)  - obesity (class III)    SOCIAL HISTORY:  Tobacco: never smoked    Review of Systems   Constitutional:  Negative for activity change, appetite change, chills, diaphoresis, fatigue, fever and unexpected weight change.   HENT:  Positive for congestion and sore throat. Negative for ear pain, hearing loss, nosebleeds and trouble swallowing.    Eyes:  Negative for pain and visual disturbance.   Respiratory:  Positive for wheezing. Negative for cough, choking, chest tightness and shortness of breath.    Cardiovascular:  Negative for chest pain.   Gastrointestinal:  Negative for abdominal pain, blood in stool, constipation, diarrhea, nausea and vomiting.   Musculoskeletal:  Negative for neck pain.   Neurological:  Negative for dizziness and headaches.     Objective     /89 (BP Location: Right arm, Patient Position: Sitting, BP Cuff Size: Adult)   Pulse 74   Temp 36.1 °C (97 °F) (Temporal)   Ht 1.905 m (6' 3\")   Wt (!) 154 kg (340 lb 3.2 oz)   BMI 42.52 kg/m²       Physical Exam  Constitutional:       " Appearance: Normal appearance. He is obese.   HENT:      Ears:      Comments: Bilateral ear wax, tympanic membrane not visualized     Nose: Nose normal.      Mouth/Throat:      Mouth: Mucous membranes are moist.      Pharynx: Oropharynx is clear.   Eyes:      Conjunctiva/sclera: Conjunctivae normal.      Pupils: Pupils are equal, round, and reactive to light.   Cardiovascular:      Rate and Rhythm: Normal rate and regular rhythm.      Pulses: Normal pulses.      Heart sounds: Normal heart sounds.   Pulmonary:      Effort: Pulmonary effort is normal. No respiratory distress.      Breath sounds: Normal breath sounds. No wheezing.   Chest:      Chest wall: No tenderness.   Abdominal:      Palpations: Abdomen is soft.   Musculoskeletal:         General: Normal range of motion.      Cervical back: Normal range of motion and neck supple.   Skin:     General: Skin is warm and dry.   Neurological:      Mental Status: He is alert. Mental status is at baseline.   Psychiatric:         Mood and Affect: Mood normal.       Assessment/Plan     Cuong Peña is a 43 y.o. male with pmhx of developmental delay, HIV, epilepsy, hemophilia A, and cirrhosis who presents for Follow-up (Concerns of coughing up cold, sore throat and hoarness.). The pt is in no acute distress and sitting comfortably on the exam bed. Pt's mother (caregiver) was present and able to provide full history. Physical exam was unremarkable. The most likely diagnosis is a viral URI. However, given the pt's pmhx and length of symptoms, will order CXR to r/o pneumonia. Pt's mother was counseled on warning symptoms that should prompt an appointment or an ED visit including fever not reduced by tylenol, chills, worsening dyspnea, chest pain or pressure, or poor oral intake.     Diagnoses and all orders for this visit:  Subacute cough  -     XR chest 2 views; Future  Excessive cerumen in both ear canals  -     carbamide peroxide (Debrox) 6.5 % otic solution;  Administer 5 drops into each ear 2 times a day for 4 days.      Seen and discussed with Dr. Jay Bhatt, MS3    Discussed with Dr Malena Thompson MD  Family Medicine PGY2    I saw and evaluated the patient with the medical student. I personally obtained the key and critical portions of the history and physical exam. I reviewed and modified the student's documentation and discussed patient with the medical student. I agree with the above documentation and medical decision making.

## 2024-01-25 NOTE — ASSESSMENT & PLAN NOTE
The patients's mother has met with Frank Forbes in the past to go over change in the patient's diet. Reemphasized the importance of diet modification.

## 2024-01-25 NOTE — ASSESSMENT & PLAN NOTE
Mr Peña' blood pressure was elevated today. However, his mother stated that she checks his blood pressure every day that the recordings were normal. We asked that she call the clinic with the recordings.

## 2024-01-26 ENCOUNTER — TELEPHONE (OUTPATIENT)
Dept: PRIMARY CARE | Facility: CLINIC | Age: 44
End: 2024-01-26
Payer: MEDICARE

## 2024-01-26 DIAGNOSIS — B20 HIV DISEASE (MULTI): ICD-10-CM

## 2024-01-26 DIAGNOSIS — K74.69 OTHER CIRRHOSIS OF LIVER (MULTI): ICD-10-CM

## 2024-01-26 DIAGNOSIS — Z79.899 HIGH RISK MEDICATION USE: ICD-10-CM

## 2024-01-26 LAB
HIV1 RNA # PLAS NAA DL=20: NOT DETECTED {COPIES}/ML
HIV1 RNA SPEC NAA+PROBE-LOG#: NORMAL {LOG_COPIES}/ML

## 2024-01-26 NOTE — TELEPHONE ENCOUNTER
Result Communication    Resulted Orders   XR chest 2 views    Narrative    Interpreted By:  Isaac Urbina and Ritchie Brandon   STUDY:  XR CHEST 2 VIEWS;  1/25/2024 11:01 am      INDICATION:  Signs/Symptoms:cough.      COMPARISON:  None.      ACCESSION NUMBER(S):  [id number]      ORDERING CLINICIAN:  SURENDRA THOMPSON      FINDINGS:  PA and lateral radiographs of the chest were provided.  Additional PA  dual energy images were also provided.              CARDIOMEDIASTINAL SILHOUETTE:  Cardiomediastinal silhouette is normal in size and configuration.      LUNGS:  Lungs are clear.      ABDOMEN:  No remarkable upper abdominal findings.      BONES:  No acute osseous changes.        Impression    1.  No evidence of acute cardiopulmonary process.      I personally reviewed the images/study and I agree with the findings  as stated by resident Bradley Urena. This study was interpreted  at [facility], [place], [place].      MACRO:  None      Signed by: Isaac Urbina 1/26/2024 10:47 AM  Dictation workstation:   [id number]       11:43 AM      Results were successfully communicated with the mother and they acknowledged their understanding.    Surendra Thompson MD  Family Medicine PGY2

## 2024-01-31 NOTE — PROGRESS NOTES
Patient's mother called office with BP readings over the past month:  /81 HR 71  131/89, 76  107/78, 80  110/72, 80  116/76, 80  88/62, 73  101/63, 69  122/73, 76  119/74, 72  106/64, 62  88/56, 62  99/68, 66  92/52, 68  123/79, 69  91/54, 67  82/57, 75  95/53, 67  120/74, 64  117/70, 70  100/63, 69  107/66, 65  116/82, 57  119/82, 63    Mother will continue to monitor pt's BP.

## 2024-02-06 NOTE — PROGRESS NOTES
I saw and evaluated the patient. I personally obtained the key and critical portions of the history and physical exam or was physically present for key and critical portions performed by the resident/fellow. I reviewed the resident/fellow's documentation and discussed the patient with the resident/fellow. I agree with the resident/fellow's medical decision making as documented in the note.  The chest xray showed no acute cardiopulmonary process, so symptomatic, supportive treatment is indicated first.     Jimena Guy MD

## 2024-03-01 DIAGNOSIS — D66 HEMOPHILIA A (MULTI): ICD-10-CM

## 2024-03-01 RX ORDER — EMICIZUMAB 150 MG/ML
1.5 INJECTION, SOLUTION SUBCUTANEOUS
Qty: 8 ML | Refills: 2 | Status: SHIPPED | OUTPATIENT
Start: 2024-03-01 | End: 2024-05-10 | Stop reason: SDUPTHER

## 2024-05-10 DIAGNOSIS — D66 HEMOPHILIA A (MULTI): ICD-10-CM

## 2024-05-10 RX ORDER — EMICIZUMAB 150 MG/ML
1.5 INJECTION, SOLUTION SUBCUTANEOUS
Qty: 8 ML | Refills: 2 | Status: SHIPPED | OUTPATIENT
Start: 2024-05-10 | End: 2024-08-08

## 2024-06-10 ENCOUNTER — TELEPHONE (OUTPATIENT)
Dept: IMMUNOLOGY | Facility: CLINIC | Age: 44
End: 2024-06-10
Payer: MEDICARE
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
## 2024-06-17 ENCOUNTER — TELEPHONE (OUTPATIENT)
Dept: IMMUNOLOGY | Facility: CLINIC | Age: 44
End: 2024-06-17
Payer: MEDICARE

## 2024-06-17 NOTE — TELEPHONE ENCOUNTER
Ashley submits EFA application for water bill, waiting to hear from ARAP coordinator if Pt eligible for assistance. EFA approved for full amount. Sw sends the following email to Pt and his mother to keep them updated. Ashley will continue to follow.    From: Leonie Adames   Sent: Monday, June 17, 2024 4:18 PM  To: '[email or web address]' <[email or web address]>  Subject: RE: Contact Information    Hi Ms. Peña,    The application I submitted for your water bill was approved - I'm waiting to hear back to make sure that the copy of the bill you provided is scanned without any issues in the  payables department (sometimes they ask for an original). Your  bill I'm still working on - I can't guarantee that I'll be able to get help with it, and if I can it will only be for a portion of the cost and not the entire arrears. I'll let you know ASAP!    Thanks,    Leonie “MJ” MARCO Adames  Pronouns: they/them (What is this?)  Phone: [phone]  Fax: [phone]

## 2024-06-26 ENCOUNTER — LAB (OUTPATIENT)
Dept: LAB | Facility: HOSPITAL | Age: 44
End: 2024-06-26
Payer: MEDICARE

## 2024-06-26 ENCOUNTER — OFFICE VISIT (OUTPATIENT)
Dept: HEMATOLOGY/ONCOLOGY | Facility: HOSPITAL | Age: 44
End: 2024-06-26
Payer: MEDICARE

## 2024-06-26 ENCOUNTER — DOCUMENTATION (OUTPATIENT)
Dept: HEMATOLOGY/ONCOLOGY | Facility: HOSPITAL | Age: 44
End: 2024-06-26

## 2024-06-26 ENCOUNTER — DOCUMENTATION (OUTPATIENT)
Dept: HEMATOLOGY/ONCOLOGY | Facility: HOSPITAL | Age: 44
End: 2024-06-26
Payer: MEDICARE

## 2024-06-26 VITALS
DIASTOLIC BLOOD PRESSURE: 81 MMHG | WEIGHT: 315 LBS | HEART RATE: 82 BPM | RESPIRATION RATE: 17 BRPM | SYSTOLIC BLOOD PRESSURE: 114 MMHG | OXYGEN SATURATION: 97 % | BODY MASS INDEX: 42.65 KG/M2 | TEMPERATURE: 98.2 F

## 2024-06-26 DIAGNOSIS — D66 HEMOPHILIA A (MULTI): ICD-10-CM

## 2024-06-26 DIAGNOSIS — N60.19 DIFFUSE CYSTIC MASTOPATHY, UNSPECIFIED LATERALITY: ICD-10-CM

## 2024-06-26 DIAGNOSIS — D50.0 IRON DEFICIENCY ANEMIA DUE TO CHRONIC BLOOD LOSS: ICD-10-CM

## 2024-06-26 DIAGNOSIS — D69.6 THROMBOCYTOPENIA (CMS-HCC): ICD-10-CM

## 2024-06-26 DIAGNOSIS — D66 HEMOPHILIA A (MULTI): Primary | ICD-10-CM

## 2024-06-26 DIAGNOSIS — D72.819 LEUKOPENIA, UNSPECIFIED TYPE: ICD-10-CM

## 2024-06-26 DIAGNOSIS — B20 AIDS (MULTI): ICD-10-CM

## 2024-06-26 DIAGNOSIS — B20 HIV DISEASE (MULTI): ICD-10-CM

## 2024-06-26 DIAGNOSIS — R05.8 PRODUCTIVE COUGH: ICD-10-CM

## 2024-06-26 DIAGNOSIS — K74.69 OTHER CIRRHOSIS OF LIVER (MULTI): ICD-10-CM

## 2024-06-26 LAB
AFP SERPL-MCNC: <4 NG/ML (ref 0–9)
ALBUMIN SERPL BCP-MCNC: 3.8 G/DL (ref 3.4–5)
ALP SERPL-CCNC: 86 U/L (ref 33–120)
ALT SERPL W P-5'-P-CCNC: 24 U/L (ref 10–52)
ANION GAP SERPL CALC-SCNC: 10 MMOL/L (ref 10–20)
APTT PPP: 24 SECONDS (ref 27–38)
AST SERPL W P-5'-P-CCNC: 22 U/L (ref 9–39)
BASOPHILS # BLD AUTO: 0.03 X10*3/UL (ref 0–0.1)
BASOPHILS NFR BLD AUTO: 0.9 %
BILIRUB SERPL-MCNC: 0.9 MG/DL (ref 0–1.2)
BUN SERPL-MCNC: 8 MG/DL (ref 6–23)
CALCIUM SERPL-MCNC: 8.9 MG/DL (ref 8.6–10.3)
CHLORIDE SERPL-SCNC: 106 MMOL/L (ref 98–107)
CO2 SERPL-SCNC: 26 MMOL/L (ref 21–32)
CREAT SERPL-MCNC: 1.02 MG/DL (ref 0.5–1.3)
EGFRCR SERPLBLD CKD-EPI 2021: >90 ML/MIN/1.73M*2
EOSINOPHIL # BLD AUTO: 0.14 X10*3/UL (ref 0–0.7)
EOSINOPHIL NFR BLD AUTO: 4.1 %
ERYTHROCYTE [DISTWIDTH] IN BLOOD BY AUTOMATED COUNT: 12.2 % (ref 11.5–14.5)
FOLATE SERPL-MCNC: 8.3 NG/ML
GLUCOSE SERPL-MCNC: 105 MG/DL (ref 74–99)
HCT VFR BLD AUTO: 41.8 % (ref 41–52)
HGB BLD-MCNC: 14.8 G/DL (ref 13.5–17.5)
IGA SERPL-MCNC: 243 MG/DL (ref 70–400)
IGG SERPL-MCNC: 1940 MG/DL (ref 700–1600)
IGM SERPL-MCNC: 78 MG/DL (ref 40–230)
IMM GRANULOCYTES # BLD AUTO: 0.02 X10*3/UL (ref 0–0.7)
IMM GRANULOCYTES NFR BLD AUTO: 0.6 % (ref 0–0.9)
INR PPP: 1.1 (ref 0.9–1.1)
IRON SATN MFR SERPL: 44 % (ref 25–45)
IRON SERPL-MCNC: 114 UG/DL (ref 35–150)
LYMPHOCYTES # BLD AUTO: 1.09 X10*3/UL (ref 1.2–4.8)
LYMPHOCYTES NFR BLD AUTO: 32.2 %
MCH RBC QN AUTO: 31.6 PG (ref 26–34)
MCHC RBC AUTO-ENTMCNC: 35.4 G/DL (ref 32–36)
MCV RBC AUTO: 89 FL (ref 80–100)
MONOCYTES # BLD AUTO: 0.65 X10*3/UL (ref 0.1–1)
MONOCYTES NFR BLD AUTO: 19.2 %
NEUTROPHILS # BLD AUTO: 1.46 X10*3/UL (ref 1.2–7.7)
NEUTROPHILS NFR BLD AUTO: 43 %
NRBC BLD-RTO: 0 /100 WBCS (ref 0–0)
PLATELET # BLD AUTO: 148 X10*3/UL (ref 150–450)
POTASSIUM SERPL-SCNC: 4 MMOL/L (ref 3.5–5.3)
PROT SERPL-MCNC: 7.2 G/DL (ref 6.4–8.2)
PROT SERPL-MCNC: 7.3 G/DL (ref 6.4–8.2)
PROTHROMBIN TIME: 12.4 SECONDS (ref 9.8–12.8)
RBC # BLD AUTO: 4.68 X10*6/UL (ref 4.5–5.9)
SODIUM SERPL-SCNC: 138 MMOL/L (ref 136–145)
TIBC SERPL-MCNC: 260 UG/DL (ref 240–445)
UIBC SERPL-MCNC: 146 UG/DL (ref 110–370)
WBC # BLD AUTO: 3.4 X10*3/UL (ref 4.4–11.3)

## 2024-06-26 PROCEDURE — 82746 ASSAY OF FOLIC ACID SERUM: CPT | Performed by: INTERNAL MEDICINE

## 2024-06-26 PROCEDURE — 85025 COMPLETE CBC W/AUTO DIFF WBC: CPT

## 2024-06-26 PROCEDURE — 99214 OFFICE O/P EST MOD 30 MIN: CPT | Performed by: INTERNAL MEDICINE

## 2024-06-26 PROCEDURE — 85730 THROMBOPLASTIN TIME PARTIAL: CPT | Performed by: INTERNAL MEDICINE

## 2024-06-26 PROCEDURE — 82784 ASSAY IGA/IGD/IGG/IGM EACH: CPT | Performed by: INTERNAL MEDICINE

## 2024-06-26 PROCEDURE — 86481 TB AG RESPONSE T-CELL SUSP: CPT

## 2024-06-26 PROCEDURE — 83521 IG LIGHT CHAINS FREE EACH: CPT | Performed by: INTERNAL MEDICINE

## 2024-06-26 PROCEDURE — 3074F SYST BP LT 130 MM HG: CPT | Performed by: INTERNAL MEDICINE

## 2024-06-26 PROCEDURE — 36415 COLL VENOUS BLD VENIPUNCTURE: CPT

## 2024-06-26 PROCEDURE — 88185 FLOWCYTOMETRY/TC ADD-ON: CPT | Mod: TC | Performed by: INTERNAL MEDICINE

## 2024-06-26 PROCEDURE — 83540 ASSAY OF IRON: CPT | Performed by: INTERNAL MEDICINE

## 2024-06-26 PROCEDURE — 87536 HIV-1 QUANT&REVRSE TRNSCRPJ: CPT | Performed by: INTERNAL MEDICINE

## 2024-06-26 PROCEDURE — 82105 ALPHA-FETOPROTEIN SERUM: CPT | Performed by: INTERNAL MEDICINE

## 2024-06-26 PROCEDURE — 84155 ASSAY OF PROTEIN SERUM: CPT | Performed by: INTERNAL MEDICINE

## 2024-06-26 PROCEDURE — 85610 PROTHROMBIN TIME: CPT | Performed by: INTERNAL MEDICINE

## 2024-06-26 PROCEDURE — 3079F DIAST BP 80-89 MM HG: CPT | Performed by: INTERNAL MEDICINE

## 2024-06-26 PROCEDURE — 84165 PROTEIN E-PHORESIS SERUM: CPT | Performed by: INTERNAL MEDICINE

## 2024-06-26 PROCEDURE — 80053 COMPREHEN METABOLIC PANEL: CPT

## 2024-06-26 ASSESSMENT — ENCOUNTER SYMPTOMS
OCCASIONAL FEELINGS OF UNSTEADINESS: 0
DEPRESSION: 0
LOSS OF SENSATION IN FEET: 0

## 2024-06-26 ASSESSMENT — PAIN SCALES - GENERAL: PAINLEVEL: 0-NO PAIN

## 2024-06-26 NOTE — PROGRESS NOTES
Patient in with mom, his designated caregiver, for his annual viisit with Dr. Tejada and the [facility] team. Patient has [employer] insurance and Medicare. Patient is seen in the family practice clinic on main campus. Mom reports patients overall health has been good and bleeding disorder well managed, see medical notes. Mom reports mental & emotional health have been consistent with no new delays, no decline in behavior. She reports no depressive episodes no attempts at suicide, no attempts at self harm or attempting to hurt others, no manic or depressive mood swings, no rage or uncontrolled anger or outbursts, doesn't isolate, engages well with family, and responsive to staff. Patient gets along well with toddler that mom is adopting. Very engaged, responsive, smiles, etc. Mom reports patient is eating and sleeping well. 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 Mom reports family is doing well, reports no threats or hazards in the home, basic needs are met, no need for support service referrals at this time.     MARCO Lunsford  [facility]

## 2024-06-26 NOTE — PROGRESS NOTES
Patient ID: Cuong Peña is a 43 y.o. male.  Referring Physician: No referring provider defined for this encounter.  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  In the last year, the patient has been stable.  The inflammed knee improved with generous Adynovate Rx.  Last we started him on Hemlibra.  It has been quite successful.  He has NOT had a break through bleed all year.  He never had to use Adynovate.  His mother is quite pleased.  We have preserved his veins for future needs since Hemlibra is subcutaneous.     PMHx: Mr. Peña is a 43 yo mentally impaired man with hemophilia A, HIV, and HepC. He is a gentle giant.   He is here today for a follow up visit.  His serum is presently negative for HIV by  PCR.  He has completed treatment with Harvoni and his last HepC/HVC was not detected. HIV Rx is continuing.  His HepC has been treated in the past.       Mr. Peña presents today accompanied by his mother. The knee has improved and mom feels it is back to baseline.  A cyst on his chin has gotten bigger.      No other complaints today. No acute problems.  Noam states he feels good.    Social Hx: Mike's mom has adopted a 1 yo boy who she has cared for since infancy.     Review of Systems:   ·  System Review All other systems have been reviewed and are negative for complaint.      · Constitutional NEGATIVE: Fever, Chills, Anorexia, Weight Loss, Malaise      · Eyes NEGATIVE: Blurry Vision, Drainage, Diploplia, Redness, Vision Loss/ Change      · ENMT NEGATIVE: Nasal Discharge, Nasal Congestion, Ear Pain, Mouth Pain, Throat Pain      · Respiratory NEGATIVE: Dry Cough, Productive Cough, Hemoptysis, Wheezing, Shortness of Breath      · Cardiology NEGATIVE: Chest Pain, Dyspnea on Exertion, Orthopnea, Palpitations, Syncope      · Gastrointestinal NEGATIVE: Abdominal Pain,  Constipation, Diarrhea, Nausea, Vomiting      · Genitourinary NEGATIVE: Discharge, Dysuria, Flank Pain, Frequency, Hematuria      · Musculoskeletal NEGATIVE: Decreased ROM, Pain, Swelling, Stiffness, Weakness     Comments left lower extremity      · Neurological NEGATIVE: Dizziness, Confusion, Headache, Seizures, Syncope      · Psychiatric NEGATIVE: Mood Changes, Anxiety, Hallucinations, Sleep Changes, Suicidal Ideas      · Skin NEGATIVE: Mass, Pain, Pruritus, Rash, Ulcer      · Endocrine NEGATIVE: Heat Intolerance, Cold Intolerance, Sweat, Polyuria, Thirst      · Hematologic/Lymph NEGATIVE: Bruising, Petechiae            Allergies and Intolerances:       Allergies:         Ceclor: Drug, Unknown, Active    Review of Systems - Oncology     Objective   BSA: 2.86 meters squared  /81 (BP Location: Right arm, Patient Position: Sitting, BP Cuff Size: Large adult)   Pulse 82   Temp 36.8 °C (98.2 °F) (Skin)   Resp 17   Wt (!) 155 kg (341 lb 3.2 oz)   SpO2 97%   BMI 42.65 kg/m²      has a past medical history of Cyst of kidney, acquired, Encounter for immunization (10/09/2020), Flat foot (pes planus) (acquired), unspecified foot, Pain in unspecified ankle and joints of unspecified foot (10/07/2013), Personal history of other diseases of the digestive system, Personal history of other diseases of the nervous system and sense organs, Personal history of other drug therapy (10/21/2016), Personal history of other endocrine, nutritional and metabolic disease (05/02/2019), Personal history of other endocrine, nutritional and metabolic disease, Personal history of other infectious and parasitic diseases (10/07/2021), and Unspecified open wound, left foot, initial encounter.   has a past surgical history that includes Tonsillectomy (10/07/2013) and Ankle surgery (01/29/2014).  Family History   Problem Relation Name Age of Onset    Cataracts Mother      Diabetes Mother       Oncology History    No history exists.        Cuong Peña  reports that he has never smoked. He has never used smokeless tobacco.  He  reports no history of alcohol use.  He  reports no history of drug use.    Physical Exam  Vitals reviewed.   Constitutional:       Appearance: Normal appearance. He is obese.   HENT:      Head: Normocephalic and atraumatic.      Comments: Mike has a large cyst on the right side of his chin.  I do not recall seeing it before.  It has gotten large over the last year.  It is moveable and is non-tender.       Nose: Nose normal.   Eyes:      Extraocular Movements: Extraocular movements intact.      Conjunctiva/sclera: Conjunctivae normal.      Pupils: Pupils are equal, round, and reactive to light.   Cardiovascular:      Rate and Rhythm: Normal rate and regular rhythm.   Musculoskeletal:      Cervical back: Normal range of motion and neck supple.   Neurological:      Mental Status: He is alert.         WBC   Date/Time Value Ref Range Status   01/25/2024 09:28 AM 3.6 (L) 4.4 - 11.3 x10*3/uL Final   09/29/2023 11:37 AM 4.0 (L) 4.4 - 11.3 x10E9/L Final   03/30/2023 09:20 AM 3.5 (L) 4.4 - 11.3 x10E9/L Final   01/25/2023 02:45 PM 3.9 (L) 4.4 - 11.3 x10E9/L Final     nRBC   Date Value Ref Range Status   01/25/2024 0.0 0.0 - 0.0 /100 WBCs Final   09/29/2023 0.0 0.0 - 0.0 /100 WBC Final   03/30/2023 0.0 0.0 - 0.0 /100 WBC Final   10/27/2022 0.0 0.0 - 0.0 /100 WBC Final     RBC   Date Value Ref Range Status   01/25/2024 4.78 4.50 - 5.90 x10*6/uL Final   09/29/2023 4.64 4.50 - 5.90 x10E12/L Final   03/30/2023 4.96 4.50 - 5.90 x10E12/L Final   01/25/2023 4.75 4.50 - 5.90 x10E12/L Final     Hemoglobin   Date Value Ref Range Status   01/25/2024 15.1 13.5 - 17.5 g/dL Final   09/29/2023 15.3 13.5 - 17.5 g/dL Final   03/30/2023 15.5 13.5 - 17.5 g/dL Final   01/25/2023 15.2 13.5 - 17.5 g/dL Final     Hematocrit   Date Value Ref Range Status   01/25/2024 43.5 41.0 - 52.0 % Final   09/29/2023 42.0 41.0 - 52.0 % Final   03/30/2023 45.2 41.0  "- 52.0 % Final   01/25/2023 42.1 41.0 - 52.0 % Final     MCV   Date/Time Value Ref Range Status   01/25/2024 09:28 AM 91 80 - 100 fL Final   09/29/2023 11:37 AM 91 80 - 100 fL Final   03/30/2023 09:20 AM 91 80 - 100 fL Final   01/25/2023 02:45 PM 89 80 - 100 fL Final     MCH   Date/Time Value Ref Range Status   01/25/2024 09:28 AM 31.6 26.0 - 34.0 pg Final     MCHC   Date/Time Value Ref Range Status   01/25/2024 09:28 AM 34.7 32.0 - 36.0 g/dL Final   09/29/2023 11:37 AM 36.4 (H) 32.0 - 36.0 g/dL Final   03/30/2023 09:20 AM 34.3 32.0 - 36.0 g/dL Final   01/25/2023 02:45 PM 36.1 (H) 32.0 - 36.0 g/dL Final     RDW   Date/Time Value Ref Range Status   01/25/2024 09:28 AM 12.0 11.5 - 14.5 % Final   09/29/2023 11:37 AM 12.1 11.5 - 14.5 % Final   03/30/2023 09:20 AM 12.4 11.5 - 14.5 % Final   01/25/2023 02:45 PM 12.1 11.5 - 14.5 % Final     Platelets   Date/Time Value Ref Range Status   01/25/2024 09:28  (L) 150 - 450 x10*3/uL Final   09/29/2023 11:37  150 - 450 x10E9/L Final   03/30/2023 09:20  (L) 150 - 450 x10E9/L Final   01/25/2023 02:45  150 - 450 x10E9/L Final     No results found for: \"MPV\"  Neutrophils %   Date/Time Value Ref Range Status   01/25/2024 09:28 AM 42.3 40.0 - 80.0 % Final   09/29/2023 11:37 AM 44.6 40.0 - 80.0 % Final   03/30/2023 09:20 AM 43.4 40.0 - 80.0 % Final   01/25/2023 02:45 PM 41.3 40.0 - 80.0 % Final     Immature Granulocytes %, Automated   Date/Time Value Ref Range Status   01/25/2024 09:28 AM 0.3 0.0 - 0.9 % Final     Comment:     Immature Granulocyte Count (IG) includes promyelocytes, myelocytes and metamyelocytes but does not include bands. Percent differential counts (%) should be interpreted in the context of the absolute cell counts (cells/UL).   09/29/2023 11:37 AM 0.2 0.0 - 0.9 % Final     Comment:      Immature Granulocyte Count (IG) includes promyelocytes,    myelocytes and metamyelocytes but does not include bands.   Percent differential counts (%) should " be interpreted in the   context of the absolute cell counts (cells/L).     03/30/2023 09:20 AM 0.0 0.0 - 0.9 % Final     Comment:      Immature Granulocyte Count (IG) includes promyelocytes,    myelocytes and metamyelocytes but does not include bands.   Percent differential counts (%) should be interpreted in the   context of the absolute cell counts (cells/L).     01/25/2023 02:45 PM 0.0 0.0 - 0.9 % Final     Comment:      Immature Granulocyte Count (IG) includes promyelocytes,    myelocytes and metamyelocytes but does not include bands.   Percent differential counts (%) should be interpreted in the   context of the absolute cell counts (cells/L).       Lymphocytes %   Date/Time Value Ref Range Status   01/25/2024 09:28 AM 34.9 13.0 - 44.0 % Final   09/29/2023 11:37 AM 36.1 13.0 - 44.0 % Final   03/30/2023 09:20 AM 33.4 13.0 - 44.0 % Final   01/25/2023 02:45 PM 38.7 13.0 - 44.0 % Final     Monocytes %   Date/Time Value Ref Range Status   01/25/2024 09:28 AM 20.5 2.0 - 10.0 % Final   09/29/2023 11:37 AM 17.4 2.0 - 10.0 % Final   03/30/2023 09:20 AM 21.2 2.0 - 10.0 % Final   01/25/2023 02:45 PM 18.2 2.0 - 10.0 % Final     Eosinophils %   Date/Time Value Ref Range Status   01/25/2024 09:28 AM 1.4 0.0 - 6.0 % Final   09/29/2023 11:37 AM 1.2 0.0 - 6.0 % Final   03/30/2023 09:20 AM 1.4 0.0 - 6.0 % Final   01/25/2023 02:45 PM 1.3 0.0 - 6.0 % Final     Basophils %   Date/Time Value Ref Range Status   01/25/2024 09:28 AM 0.6 0.0 - 2.0 % Final   09/29/2023 11:37 AM 0.5 0.0 - 2.0 % Final   03/30/2023 09:20 AM 0.6 0.0 - 2.0 % Final   01/25/2023 02:45 PM 0.5 0.0 - 2.0 % Final     Neutrophils Absolute   Date/Time Value Ref Range Status   01/25/2024 09:28 AM 1.53 1.20 - 7.70 x10*3/uL Final     Comment:     Percent differential counts (%) should be interpreted in the context of the absolute cell counts (cells/uL).   09/29/2023 11:37 AM 1.79 1.20 - 7.70 x10E9/L Final   03/30/2023 09:20 AM 1.53 1.20 - 7.70 x10E9/L Final   01/25/2023  "02:45 PM 1.61 1.20 - 7.70 x10E9/L Final     Immature Granulocytes Absolute, Automated   Date/Time Value Ref Range Status   01/25/2024 09:28 AM 0.01 0.00 - 0.70 x10*3/uL Final     Lymphocytes Absolute   Date/Time Value Ref Range Status   01/25/2024 09:28 AM 1.26 1.20 - 4.80 x10*3/uL Final   09/29/2023 11:37 AM 1.45 1.20 - 4.80 x10E9/L Final   03/30/2023 09:20 AM 1.18 (L) 1.20 - 4.80 x10E9/L Final   01/25/2023 02:45 PM 1.51 1.20 - 4.80 x10E9/L Final     Monocytes Absolute   Date/Time Value Ref Range Status   01/25/2024 09:28 AM 0.74 0.10 - 1.00 x10*3/uL Final   09/29/2023 11:37 AM 0.70 0.10 - 1.00 x10E9/L Final   03/30/2023 09:20 AM 0.75 0.10 - 1.00 x10E9/L Final   01/25/2023 02:45 PM 0.71 0.10 - 1.00 x10E9/L Final     Eosinophils Absolute   Date/Time Value Ref Range Status   01/25/2024 09:28 AM 0.05 0.00 - 0.70 x10*3/uL Final   09/29/2023 11:37 AM 0.05 0.00 - 0.70 x10E9/L Final   03/30/2023 09:20 AM 0.05 0.00 - 0.70 x10E9/L Final   01/25/2023 02:45 PM 0.05 0.00 - 0.70 x10E9/L Final     Basophils Absolute   Date/Time Value Ref Range Status   01/25/2024 09:28 AM 0.02 0.00 - 0.10 x10*3/uL Final   09/29/2023 11:37 AM 0.02 0.00 - 0.10 x10E9/L Final   03/30/2023 09:20 AM 0.02 0.00 - 0.10 x10E9/L Final   01/25/2023 02:45 PM 0.02 0.00 - 0.10 x10E9/L Final       No components found for: \"PT\"  aPTT   Date/Time Value Ref Range Status   01/25/2023 02:45 PM 40 (H) 26 - 39 sec Final     Comment:       THE APTT IS NO LONGER USED FOR MONITORING     UNFRACTIONATED HEPARIN THERAPY.    FOR MONITORING HEPARIN THERAPY,     USE THE HEPARIN ASSAY.     10/26/2022 10:43 AM 38 26 - 39 sec Final     Comment:       THE APTT IS NO LONGER USED FOR MONITORING     UNFRACTIONATED HEPARIN THERAPY.    FOR MONITORING HEPARIN THERAPY,     USE THE HEPARIN ASSAY.     10/27/2021 10:33 AM 34 25 - 35 sec Final     Comment:       THE APTT IS NO LONGER USED FOR MONITORING     UNFRACTIONATED HEPARIN THERAPY.    FOR MONITORING HEPARIN THERAPY,     USE THE " HEPARIN ASSAY.       LABS: GLUCOSE 106; normal chemistries other wise.  44% iron saturation;  PT=12.4 (nl); aPTT < 24 sec (short-characteristic of being on Hemlibra). IgG 1940 with normal IgM (78) and IgA (243).  HIV DNA and RNA - not detected.    Assessment/Plan  Overall, Mike is stable.  Hemlibra has helped a great deal. No bleeds all year.    The cyst on right chin needs to be evaluated.  I will consult Dr. Peraza in Surg Onc.  I will manage the hemophilia for the procedure.     RTC -  1 year     Diagnoses and all orders for this visit:  Iron deficiency anemia due to chronic blood loss  -     aPTT; Future  -     Protime-INR; Future  -     CBC and Auto Differential; Future  -     Comprehensive Metabolic Panel; Future  -     Folate; Future  -     Iron and TIBC; Future  -     Immunoglobulins (IgG, IgA, IgM); Future  -     [place]/Lambda Free Light Chain, Serum; Future  -     Serum Protein Electrophoresis; Future  -     Clinic Appointment Request Follow Up; [facility]; Future  Hemophilia A (Multi)  -     aPTT; Future  -     Protime-INR; Future  -     CBC and Auto Differential; Future  -     Comprehensive Metabolic Panel; Future  -     Folate; Future  -     Iron and TIBC; Future  -     Immunoglobulins (IgG, IgA, IgM); Future  -     [place]/Lambda Free Light Chain, Serum; Future  -     Serum Protein Electrophoresis; Future  -     Clinic Appointment Request Follow Up; [facility]; Future  Thrombocytopenia (CMS-HCC)  -     aPTT; Future  -     Protime-INR; Future  -     CBC and Auto Differential; Future  -     Comprehensive Metabolic Panel; Future  -     Folate; Future  -     Iron and TIBC; Future  -     Immunoglobulins (IgG, IgA, IgM); Future  -     [place]/Lambda Free Light Chain, Serum; Future  -     Serum Protein Electrophoresis; Future  -     Clinic Appointment Request Follow Up; [facility]; Future  Leukopenia, unspecified type  -     aPTT; Future  -     Protime-INR; Future  -     CBC and Auto  Differential; Future  -     Comprehensive Metabolic Panel; Future  -     Folate; Future  -     Iron and TIBC; Future  -     Immunoglobulins (IgG, IgA, IgM); Future  -     [place]/Lambda Free Light Chain, Serum; Future  -     Serum Protein Electrophoresis; Future  -     Clinic Appointment Request Follow Up; [facility]; Future         Mitch Tejada MD

## 2024-06-26 NOTE — PROGRESS NOTES
[facility] PT Consult    Patient: Cuong Peña  MRN: [id number]  06/26/24    Assessment   Cuong is a 43 y.o. with PMHx Hemophilia A who was seen by Physical Therapy in clinic 6/26/2024. Pts mom states Cuong is doing well. States that he goes to [facility] Mon-Fri. States that Cuong has not had any bleeds in the past year. States that Cuong has previous surgery L elbow and L ankle. Previous history of L knee issues which are now resolved. Cuong states he is not having any pain. States that walking and going up and down stairs are good.    Upon assessment, pt demonstrates limited L forearm supination and limited L elbow extension. Demonstrates min-mod ROM limitations grossly throughout L ankle. No swelling, warmth, or tenderness noted at any joints.     Plan/Recommendations:  No further [facility] PT needs at this time. Physical Therapy to follow during clinic visits.      Subjective   Pts mom states Cuong is doing well. States that he goes to [facility] Mon-Fri. States that Cuong has not had any bleeds in the past year. States that Cuong has previous surgery L elbow and L ankle. Previous history of L knee issues which are now resolved. Cuong states he is not having any pain. States that walking and going up and down stairs are good.    Past Medical History:   Past Medical History:   Diagnosis Date    Cyst of kidney, acquired     Kidney cysts    Encounter for immunization 10/09/2020    Need for vaccination    Flat foot (pes planus) (acquired), unspecified foot     Flat foot, acquired    Pain in unspecified ankle and joints of unspecified foot 10/07/2013    Ankle joint pain    Personal history of other diseases of the digestive system     Personal history of cirrhosis    Personal history of other diseases of the nervous system and sense organs     Personal history of otitis media    Personal history of other drug therapy 10/21/2016    History of influenza vaccination    Personal  history of other endocrine, nutritional and metabolic disease 05/02/2019    History of morbid obesity    Personal history of other endocrine, nutritional and metabolic disease     History of obesity    Personal history of other infectious and parasitic diseases 10/07/2021    History of hepatitis C virus infection    Unspecified open wound, left foot, initial encounter     Open wound of left foot       Past Surgical History:   Past Surgical History:   Procedure Laterality Date    ANKLE SURGERY  01/29/2014    Ankle Surgery    TONSILLECTOMY  10/07/2013    Tonsillectomy       Prophylaxis: Yes  Hemlibra 1x a week (Wednesdays)    Chronic arthropathy: L elbow, L ankle    Additional Review  Lives with [facility]  School/Vocational: [employer] (Mon-Fri)    Objective   Joint Assessment:  Left Elbow: WFL - No swelling, warmth, or tenderness noted  Right Elbow: WFL - No swelling, warmth, or tenderness noted  Left Knee: WFL - No swelling, warmth, or tenderness noted  Right Knee: WFL - No swelling, warmth, or tenderness noted  Left Ankle: WFL - No swelling, warmth, or tenderness noted  Right Ankle: WFL - No swelling, warmth, or tenderness noted    Range of Motion:   Forearm Supination: Left: mod limitation and Right WNL  Forearm Pronation: Left: WFL and Right WNL  Elbow Extention (0): Left mod limitation and Right WNL  Elbow Flexion (0-145): Left WFL and Right WNL  Knee Flexion (0-135): Left WNL and Right WNL  Knee Extension (0): Left WNL and Right WNL  Ankle Plantarflexion (0-50): Left min-mod limitation and Right WNL  Ankle Dorsiflexion: Left min-mod limitation and Right WNL  Ankle Inversion (0-30): Left min-mod limitation and Right WNL  Ankle Eversion (0-20): Left min-mod limitation and Right WNL      Mobility:  Gait: WFL  Sit <> Stand: WFL      Felisha Junior, PT

## 2024-06-27 DIAGNOSIS — D66 HEMOPHILIA A (MULTI): Primary | ICD-10-CM

## 2024-06-27 LAB
CD3+CD4+ CELLS # BLD: 0.39 X10E9/L
CD3+CD4+ CELLS # BLD: 391 /MM3
CD3+CD4+ CELLS NFR BLD: 31 %
CD3+CD4+ CELLS/CD3+CD8+ CLL BLD: 1.15 %
CD3+CD8+ CELLS # BLD: 0.34 X10E9/L
CD3+CD8+ CELLS NFR BLD: 27 %
HIV1 RNA # PLAS NAA DL=20: NOT DETECTED {COPIES}/ML
HIV1 RNA SPEC NAA+PROBE-LOG#: NORMAL {LOG_COPIES}/ML
KAPPA LC SERPL-MCNC: 4.21 MG/DL (ref 0.33–1.94)
KAPPA LC/LAMBDA SER: 1.67 {RATIO} (ref 0.26–1.65)
LAMBDA LC SERPL-MCNC: 2.52 MG/DL (ref 0.57–2.63)
LYMPHOCYTES # SPEC AUTO: 1.26 X10*3/UL

## 2024-06-28 ENCOUNTER — OFFICE VISIT (OUTPATIENT)
Dept: SURGICAL ONCOLOGY | Facility: HOSPITAL | Age: 44
End: 2024-06-28
Payer: MEDICARE

## 2024-06-28 ENCOUNTER — HOSPITAL ENCOUNTER (OUTPATIENT)
Dept: RADIOLOGY | Facility: HOSPITAL | Age: 44
Discharge: HOME | End: 2024-06-28
Payer: MEDICARE

## 2024-06-28 VITALS
HEART RATE: 83 BPM | OXYGEN SATURATION: 97 % | BODY MASS INDEX: 42.66 KG/M2 | WEIGHT: 315 LBS | TEMPERATURE: 97.5 F | SYSTOLIC BLOOD PRESSURE: 115 MMHG | DIASTOLIC BLOOD PRESSURE: 92 MMHG | RESPIRATION RATE: 17 BRPM

## 2024-06-28 DIAGNOSIS — K74.69 OTHER CIRRHOSIS OF LIVER (MULTI): ICD-10-CM

## 2024-06-28 DIAGNOSIS — R22.0 MASS OF CHIN: ICD-10-CM

## 2024-06-28 DIAGNOSIS — D66 HEMOPHILIA A (MULTI): ICD-10-CM

## 2024-06-28 DIAGNOSIS — R22.0 MASS OF CHIN: Primary | ICD-10-CM

## 2024-06-28 LAB
ALBUMIN: 3.7 G/DL (ref 3.4–5)
ALPHA 1 GLOBULIN: 0.3 G/DL (ref 0.2–0.6)
ALPHA 2 GLOBULIN: 0.7 G/DL (ref 0.4–1.1)
BETA GLOBULIN: 0.7 G/DL (ref 0.5–1.2)
GAMMA GLOBULIN: 1.8 G/DL (ref 0.5–1.4)
NIL(NEG) CONTROL SPOT COUNT: NORMAL
PANEL A SPOT COUNT: 2
PANEL B SPOT COUNT: 0
PATH REVIEW-SERUM PROTEIN ELECTROPHORESIS: ABNORMAL
POS CONTROL SPOT COUNT: NORMAL
PROTEIN ELECTROPHORESIS COMMENT: ABNORMAL
T-SPOT. TB INTERPRETATION: NEGATIVE

## 2024-06-28 PROCEDURE — 3080F DIAST BP >= 90 MM HG: CPT | Performed by: SURGERY

## 2024-06-28 PROCEDURE — 76536 US EXAM OF HEAD AND NECK: CPT

## 2024-06-28 PROCEDURE — 76705 ECHO EXAM OF ABDOMEN: CPT

## 2024-06-28 PROCEDURE — 3074F SYST BP LT 130 MM HG: CPT | Performed by: SURGERY

## 2024-06-28 PROCEDURE — 99205 OFFICE O/P NEW HI 60 MIN: CPT | Performed by: SURGERY

## 2024-06-28 PROCEDURE — 99215 OFFICE O/P EST HI 40 MIN: CPT | Performed by: SURGERY

## 2024-06-28 PROCEDURE — 1036F TOBACCO NON-USER: CPT | Performed by: SURGERY

## 2024-06-28 ASSESSMENT — PAIN SCALES - GENERAL: PAINLEVEL: 0-NO PAIN

## 2024-06-28 NOTE — PROGRESS NOTES
HTC Nursing note  6/26/24  Annual multidisciplinary visit  HTC Nursing note    Patient is 44 yo male with Hemophilia A. Patient was seen in clinic by physician, nursing staff, physical therapist, and . Patient's mother is his legal guardian and reports no bleeds since switching to Hemlibra last year. She states patient has not required Adynovate in past year. Patient noted to have large moveable, non-tender cyst on the right side of his chin. Referral was sent to surgery and ENT and Dr. Tejada will be consulted to manage hemophilia during removal procedure. Patient's guardian signed consent to enroll patient into [employer] free trial so factor can be used for procedure. Patient's mom will call if there are any dental work or procedures scheduled.     Patient had annual blood work drawn. [facility] labs were not drawn since no factor was used in past year. Emergency medication card was updated, reviewed, and given to patient. Patient's mom was educated on importance of getting a medical alert. Patient choice policy was reviewed and signed. Annual visit scheduled for one year.

## 2024-06-28 NOTE — PROGRESS NOTES
History and Physical    Referring Provider:  Rik Tejada MD Charles M Butrey, MD    Chief Complaint:  Right chin mass      History of Present Illness:  This is a 43 y.o. male who presents with Hemophilia A and intellectual disability who has a right chin subcutaneous mass that has been present and growing for years.  He denies any pain.  No drainage.  No infection history.  The patient's mom reports that it is grown very slowly and is larger than it was previously.    Past Medical History:  Past Medical History:  No date: Cyst of kidney, acquired      Comment:  Kidney cysts  10/09/2020: Encounter for immunization      Comment:  Need for vaccination  No date: Flat foot (pes planus) (acquired), unspecified foot      Comment:  Flat foot, acquired  10/07/2013: Pain in unspecified ankle and joints of unspecified foot      Comment:  Ankle joint pain  No date: Personal history of other diseases of the digestive system      Comment:  Personal history of cirrhosis  No date: Personal history of other diseases of the nervous system and   sense organs      Comment:  Personal history of otitis media  10/21/2016: Personal history of other drug therapy      Comment:  History of influenza vaccination  05/02/2019: Personal history of other endocrine, nutritional and   metabolic disease      Comment:  History of morbid obesity  No date: Personal history of other endocrine, nutritional and   metabolic disease      Comment:  History of obesity  10/07/2021: Personal history of other infectious and parasitic   diseases      Comment:  History of hepatitis C virus infection  No date: Unspecified open wound, left foot, initial encounter      Comment:  Open wound of left foot     Past Surgical History:  Past Surgical History:  01/29/2014: ANKLE SURGERY      Comment:  Ankle Surgery  10/07/2013: TONSILLECTOMY      Comment:  Tonsillectomy     Medications:  Current Outpatient Medications   Medication Instructions    aminocaproic acid  (AMICAR ORAL) 25 mg, oral, As needed    aminocaproic acid (AMICAR ORAL) oral    antihemophilic factor rcmb PEG (Adynovate) 250 (+/-) unit solution 9,778 Units, intravenous, 2 times weekly, On Mondays and Thursdays    celecoxib (CELEBREX) 100 mg, oral, 2 times daily    Hemlibra 1.5 mg/kg, subcutaneous, Every 7 days, Disp 4 doses.  Include ancillary supplies.  2 refills    levETIRAcetam (KEPPRA) 1,000 mg, oral, 2 times daily        Allergies:  Allergies   Allergen Reactions    Cefaclor Unknown and Seizure        Family History:  family history includes Cataracts in his mother; Diabetes in his mother.     Social History:   reports that he has never smoked. He has never used smokeless tobacco. He reports that he does not drink alcohol and does not use drugs.     Review of Systems:  A complete 12 point review of systems was performed and is negative except as noted in the history of present illness.    Vital Signs:  Vitals:    06/28/24 0821   BP: (!) 115/92   Pulse: 83   Resp: 17   Temp: 36.4 °C (97.5 °F)   SpO2: 97%        Physical Exam:  GEN: No acute distress, Healthy appearing  HEENT: Moist mucus membranes, normocephalic.  No evidence of tracking from the intraoral mucosa to this mass  CARDS: RRR  PULM: No respiratory distress  LYMPH: No palpable lymphadenopathy in bilateral cervical basins  SKIN: Right chin mass 3.5 x 2.0 x 1.5 cm in size.  Firm.  Mobile.  No clear connection to the overlying epidermis.  NEURO: No gross sensorimotor deficits  EXT: No arm or leg swelling    Laboratory Values:  Lab Results   Component Value Date    WBC 3.4 (L) 06/26/2024    HGB 14.8 06/26/2024    HCT 41.8 06/26/2024    MCV 89 06/26/2024     (L) 06/26/2024        Chemistry    Lab Results   Component Value Date/Time     06/26/2024 1018    K 4.0 06/26/2024 1018     06/26/2024 1018    CO2 26 06/26/2024 1018    BUN 8 06/26/2024 1018    CREATININE 1.02 06/26/2024 1018    Lab Results   Component Value Date/Time    CALCIUM  "8.9 06/26/2024 1018    ALKPHOS 86 06/26/2024 1018    AST 22 06/26/2024 1018    ALT 24 06/26/2024 1018    BILITOT 0.9 06/26/2024 1018           No results found for: \"PR1\"      Imaging:  I have personally reviewed the images and the radiologist's report.       Assessment:  This is a 43 y.o. male who presents with Hemophilia A and intellectual disability who has a right chin subcutaneous mass that has been present and growning for years.   This may be a cyst or other benign subcutaneous mass. Excision is reasonable for diagnosis and treatment, however in light of the Hemophilia, I will obtain an US prior to surgery to minimize the surprises. Any intervention will require the assistance of the patient's Hematologist for management of Hemophilia A    Plan:  -- US right chin  -- Will discuss surgical options with the family and Hematologist thereafter.   -- The patient's mother asked very appropriate questions that were answered to the best of my ability with the current information at hand. She knows to call with any questions or concerns that arise    Paddy Peraza MD, MPH  "

## 2024-07-01 ENCOUNTER — APPOINTMENT (OUTPATIENT)
Dept: RADIOLOGY | Facility: HOSPITAL | Age: 44
End: 2024-07-01
Payer: MEDICARE

## 2024-07-25 ENCOUNTER — OFFICE VISIT (OUTPATIENT)
Dept: IMMUNOLOGY | Facility: CLINIC | Age: 44
End: 2024-07-25
Payer: MEDICARE

## 2024-07-25 VITALS
TEMPERATURE: 98 F | SYSTOLIC BLOOD PRESSURE: 123 MMHG | HEART RATE: 71 BPM | WEIGHT: 315 LBS | OXYGEN SATURATION: 97 % | RESPIRATION RATE: 16 BRPM | HEIGHT: 75 IN | DIASTOLIC BLOOD PRESSURE: 85 MMHG | BODY MASS INDEX: 39.17 KG/M2

## 2024-07-25 DIAGNOSIS — K74.69 OTHER CIRRHOSIS OF LIVER (MULTI): ICD-10-CM

## 2024-07-25 DIAGNOSIS — E55.9 VITAMIN D DEFICIENCY: ICD-10-CM

## 2024-07-25 DIAGNOSIS — B20 HIV DISEASE (MULTI): Primary | ICD-10-CM

## 2024-07-25 DIAGNOSIS — B20 HIV INFECTION, UNSPECIFIED SYMPTOM STATUS (MULTI): ICD-10-CM

## 2024-07-25 DIAGNOSIS — E66.01 MORBID OBESITY (MULTI): ICD-10-CM

## 2024-07-25 DIAGNOSIS — D66 CONGENITAL FACTOR VIII DISORDER (MULTI): ICD-10-CM

## 2024-07-25 PROCEDURE — 3079F DIAST BP 80-89 MM HG: CPT | Performed by: INTERNAL MEDICINE

## 2024-07-25 PROCEDURE — 3074F SYST BP LT 130 MM HG: CPT | Performed by: INTERNAL MEDICINE

## 2024-07-25 PROCEDURE — 99214 OFFICE O/P EST MOD 30 MIN: CPT | Performed by: INTERNAL MEDICINE

## 2024-07-25 PROCEDURE — 1036F TOBACCO NON-USER: CPT | Performed by: INTERNAL MEDICINE

## 2024-07-25 PROCEDURE — 3008F BODY MASS INDEX DOCD: CPT | Performed by: INTERNAL MEDICINE

## 2024-07-25 ASSESSMENT — ENCOUNTER SYMPTOMS
ALLERGIC/IMMUNOLOGIC NEGATIVE: 1
EYE PAIN: 0
NAUSEA: 0
COLOR CHANGE: 0
APPETITE CHANGE: 0
FEVER: 0
CONSTIPATION: 0
SORE THROAT: 0
WOUND: 0
BACK PAIN: 0
DECREASED CONCENTRATION: 0
NECK PAIN: 0
CONSTITUTIONAL NEGATIVE: 1
RHINORRHEA: 0
HEADACHES: 0
SEIZURES: 0
SLEEP DISTURBANCE: 0
CARDIOVASCULAR NEGATIVE: 1
SINUS PRESSURE: 0
HALLUCINATIONS: 0
VOMITING: 0
DIZZINESS: 0
NECK STIFFNESS: 0
FREQUENCY: 0
LIGHT-HEADEDNESS: 0
EYE ITCHING: 0
CHILLS: 0
ANAL BLEEDING: 0
EYE DISCHARGE: 0
FLANK PAIN: 0
PHOTOPHOBIA: 0
ENDOCRINE NEGATIVE: 1
DYSURIA: 0
JOINT SWELLING: 0
EYE REDNESS: 0
MYALGIAS: 0
DIAPHORESIS: 0
HYPERACTIVE: 0
ADENOPATHY: 0
BLOOD IN STOOL: 0
EYES NEGATIVE: 1
PSYCHIATRIC NEGATIVE: 1
NUMBNESS: 0
DIFFICULTY URINATING: 0
ABDOMINAL PAIN: 0
TREMORS: 0
MUSCULOSKELETAL NEGATIVE: 1
HEMATURIA: 0
RECTAL PAIN: 0
GASTROINTESTINAL NEGATIVE: 1
HEMATOLOGIC/LYMPHATIC NEGATIVE: 1
WHEEZING: 0
ABDOMINAL DISTENTION: 0
BRUISES/BLEEDS EASILY: 0
NERVOUS/ANXIOUS: 0
ARTHRALGIAS: 0
NEUROLOGICAL NEGATIVE: 1
COUGH: 0
SINUS PAIN: 0
SHORTNESS OF BREATH: 0
DIARRHEA: 0
FATIGUE: 0
SPEECH DIFFICULTY: 0
DYSPHORIC MOOD: 0
CONFUSION: 0

## 2024-07-25 ASSESSMENT — PAIN SCALES - GENERAL: PAINLEVEL: 0-NO PAIN

## 2024-07-25 NOTE — LETTER
07/25/24    Luis M Michaels MD  [address]  [place]  [facility] [address]      Dear Dr. Luis M Michaels MD,    I am writing to confirm that your patient, Cuong Peña, received care in my office on 07/25/24. I have enclosed a summary of the care provided to Cuong for your reference.    Please contact me with any questions you may have regarding the visit.    Sincerely,         Michael Larson MD  [address]  [facility] [address]  [facility] [address]  [phone]    CC: No Recipients

## 2024-07-25 NOTE — ASSESSMENT & PLAN NOTE
The  mother states that she has changed the diet. Weight remains a problem. The mother was informed that the patient blood pressure is mildly elevated at times. Advised that she continues to optimize Mr Peña' diet.

## 2024-07-25 NOTE — PROGRESS NOTES
HIV Clinic Visit:   Cuong Peña is a 43 y.o. male was last seen in [facility] on today.    Missed antiretroviral doses in last 72 hours? No    Sexually active? No Partner/s aware of diagnosis? No    Condom use? No Partner on PrEP? No    Tobacco use: No    SUBJECTIVE: Mr Peña continues to do well per his mother. He has not missed any medications. He has been seen by surgery for the soft tissue mass in the chin. US revealed hemorrhagic vs debris contents of cyst. The plan is to remove this cyst. The patient's mother will get an appointment with Dr Tejada to adjust hemophilia management prior to surgery.      Review of Systems   Constitutional: Negative.  Negative for appetite change, chills, diaphoresis, fatigue and fever.   HENT: Negative.  Negative for congestion, dental problem, ear discharge, ear pain, hearing loss, mouth sores, nosebleeds, rhinorrhea, sinus pressure, sinus pain, sneezing, sore throat and tinnitus.    Eyes: Negative.  Negative for photophobia, pain, discharge, redness, itching and visual disturbance.   Respiratory:  Negative for cough, shortness of breath and wheezing.    Cardiovascular: Negative.  Negative for chest pain.   Gastrointestinal: Negative.  Negative for abdominal distention, abdominal pain, anal bleeding, blood in stool, constipation, diarrhea, nausea, rectal pain and vomiting.   Endocrine: Negative.    Genitourinary: Negative.  Negative for difficulty urinating, dysuria, enuresis, flank pain, frequency, genital sores, hematuria and urgency.   Musculoskeletal: Negative.  Negative for arthralgias, back pain, gait problem, joint swelling, myalgias, neck pain and neck stiffness.   Skin: Negative.  Negative for color change, pallor, rash and wound.   Allergic/Immunologic: Negative.    Neurological: Negative.  Negative for dizziness, tremors, seizures, syncope, speech difficulty, light-headedness, numbness and headaches.   Hematological: Negative.  Negative for adenopathy. Does not  "bruise/bleed easily.   Psychiatric/Behavioral: Negative.  Negative for behavioral problems, confusion, decreased concentration, dysphoric mood, hallucinations, self-injury and sleep disturbance. The patient is not nervous/anxious and is not hyperactive.          Objective   Visit Vitals  /85 (BP Location: Right arm, Patient Position: Sitting, BP Cuff Size: Large adult)   Pulse 71   Temp 36.7 °C (98 °F) (Skin)   Resp 16   Ht 1.905 m (6' 3\")   Wt (!) 156 kg (343 lb)   SpO2 97%   BMI 42.87 kg/m²   Smoking Status Never   BSA 2.87 m²        Physical Exam  Constitutional:       Appearance: Normal appearance.   HENT:      Head: Normocephalic and atraumatic.      Comments: Soft tissue mass in the chin. Not warm or tender,     Right Ear: Tympanic membrane normal.      Left Ear: Tympanic membrane normal.      Nose: Nose normal.      Mouth/Throat:      Mouth: Mucous membranes are moist.      Tongue: No lesions.      Palate: No mass.      Pharynx: Oropharynx is clear. No pharyngeal swelling, oropharyngeal exudate, posterior oropharyngeal erythema or uvula swelling.      Tonsils: No tonsillar exudate.   Eyes:      General: Lids are normal.      Extraocular Movements: Extraocular movements intact.      Conjunctiva/sclera: Conjunctivae normal.      Pupils: Pupils are equal, round, and reactive to light.   Neck:      Thyroid: No thyroid mass or thyroid tenderness.      Vascular: Normal carotid pulses. No carotid bruit or JVD.      Trachea: Trachea normal.   Cardiovascular:      Rate and Rhythm: Normal rate and regular rhythm.      Pulses: Normal pulses.      Heart sounds: Normal heart sounds.   Pulmonary:      Effort: Pulmonary effort is normal.      Breath sounds: Normal breath sounds.   Abdominal:      General: Abdomen is flat. Bowel sounds are normal.      Palpations: Abdomen is soft. There is no hepatomegaly, splenomegaly or mass.      Tenderness: There is no abdominal tenderness.   Musculoskeletal:         General: No " swelling, tenderness, deformity or signs of injury. Normal range of motion.      Cervical back: Full passive range of motion without pain, normal range of motion and neck supple.      Right lower leg: No edema.      Left lower leg: No edema.   Lymphadenopathy:      Cervical: No cervical adenopathy.   Skin:     General: Skin is warm and dry.   Neurological:      General: No focal deficit present.      Mental Status: He is alert and oriented to person, place, and time.   Psychiatric:         Mood and Affect: Mood normal.         CURRENT MEDICATIONS    Current Outpatient Medications:     aminocaproic acid (AMICAR ORAL), Take 25 mg by mouth if needed., Disp: , Rfl:     naiuuakuy-iiguzflx-pbwjdak ala (Biktarvy) -25 mg tablet, Take by mouth once daily., Disp: , Rfl:     celecoxib (CeleBREX) 100 mg capsule, Take 1 capsule (100 mg) by mouth 2 times a day., Disp: 180 capsule, Rfl: 3    emicizumab-kxwh (Hemlibra) 150 mg/mL solution, Inject 231 mg under the skin every 7 days. Disp 4 doses.  Include ancillary supplies.  2 refills, Disp: 8 mL, Rfl: 2    levETIRAcetam (Keppra) 1,000 mg tablet, Take 1 tablet (1,000 mg) by mouth 2 times a day., Disp: 60 tablet, Rfl: 11    aminocaproic acid (AMICAR ORAL), Take by mouth., Disp: , Rfl:     antihemophilic factor rcmb PEG (Adynovate) 250 (+/-) unit solution, Infuse 9,778 Units into a venous catheter 2 times a week. On Mondays and Thursdays, Disp: , Rfl:        Relevant Results  Labs  Lab Results   Component Value Date    FUE4NYQLI NOT DETECTED 09/29/2023    VR2HGV0OCIZ 0.340 06/26/2024    VITD25 64 03/30/2023      Lab Results   Component Value Date    WBC 3.4 (L) 06/26/2024    HGB 14.8 06/26/2024    HCT 41.8 06/26/2024    MCV 89 06/26/2024     (L) 06/26/2024      Lab Results   Component Value Date    GLUCOSE 105 (H) 06/26/2024    CALCIUM 8.9 06/26/2024     06/26/2024    K 4.0 06/26/2024    CO2 26 06/26/2024     06/26/2024    BUN 8 06/26/2024    CREATININE 1.02  "06/26/2024      Lab Results   Component Value Date    CHOL 160 09/29/2023    CHOL 159 10/27/2022    CHOL 165 06/08/2022     Lab Results   Component Value Date    HDL 48.8 09/29/2023    HDL 46.0 10/27/2022    HDL 46.3 06/08/2022     No results found for: \"LDLCALC\"  Lab Results   Component Value Date    TRIG 53 09/29/2023    TRIG 70 10/27/2022    TRIG 48 06/08/2022     No components found for: \"CHOLHDL\"       Assessment/Plan   Problem List Items Addressed This Visit       Cirrhosis (Multi)    Current Assessment & Plan     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 Stable per US. Will see Dr Grant in follow up.         Congenital factor VIII disorder (Multi)    HIV disease (Multi) - Primary    Current Assessment & Plan     The HIV infection rains under good control. Hew never misses medications.         Morbid obesity (Multi)    Current Assessment & Plan     The  mother states that she has changed the diet. Weight remains a problem. The mother was informed that the patient blood pressure is mildly elevated at times. Advised that she continues to optimize Mr Peña' diet.         Vitamin D deficiency    Current Assessment & Plan     On Vitamin D supplementation.               Michael Larson MD  "

## 2024-07-30 NOTE — PROGRESS NOTES
Chief complaint: Chin mass    HPI  Cuong Peña is a 43 y.o. male referred to me today by Rik Tejada MD for further evaluation and treatment of a R chin mass that has gotten larger over the last year. CT from 6/2024 showed a 3.8 cm heterogenous fluid collection favored to represent a hemorrhagic or debris-filled cyst.  Patient notably has a history of hemophilia A, cirrhosis, epilepsy, and HIV from blood transfusion when he was 4.  He is present with his mother who is his primary caregiver.  He is developmentally delayed and mom is his caregiver/decision-maker.  The mass on his chin is asymptomatic and has not drained or appeared infected.  He has not had any recent dental issues though it has been a long time since he has seen a dentist.  No similar lesions elsewhere on the body.  Remote history of tonsillectomy which was complicated with bleeding.  He does get transfusions for his hemophilia on a weekly basis and follows with hematology.  No other concerns or complaints today.  He was seen by Dr. Peraza and had US performed 6/28/24 which I personally reviewed and shows a thin-walled 3.8x3.3x1.5cm chin lesion favored debris-filled cyst.  Denies pain.  Has not affected his ability to eat.  Denies odynophagia, dysphagia or otalgia. Tolerating a regular diet.  Denies weight loss.  No fevers/chills.  No night sweats.     Social History  He reports that he has never smoked. He has never used smokeless tobacco. He reports that he does not drink alcohol and does not use drugs.    PMH  He has a past medical history of Cyst of kidney, acquired, Encounter for immunization (10/09/2020), Flat foot (pes planus) (acquired), unspecified foot, Pain in unspecified ankle and joints of unspecified foot (10/07/2013), Personal history of other diseases of the digestive system, Personal history of other diseases of the nervous system and sense organs, Personal history of other drug therapy (10/21/2016), Personal history  of other endocrine, nutritional and metabolic disease (05/02/2019), Personal history of other endocrine, nutritional and metabolic disease, Personal history of other infectious and parasitic diseases (10/07/2021), and Unspecified open wound, left foot, initial encounter.     PSH  He has a past surgical history that includes Tonsillectomy (10/07/2013) and Ankle surgery (01/29/2014).    ROS  Review of Systems   All other systems reviewed and are negative.       PE  ENT Physical Exam  Constitutional  Appearance: patient appears well-developed and well-nourished, patient is cooperative;  Communication/Voice: vocal quality normal;  Head and Face  Appearance: head appears normal and face appears atraumatic; facial lesions present;  Palpation: facial palpation normal;  Head and Face comments: ~3cm superficial mobile mass on the chin + to the right of midline. No drainage, punctum, or skin changes that would be concerning for infection.  Ear  Auricles: right auricle normal; left auricle normal;  Ear Canals: bilateral ear canals impacted cerumen observed;  Ear comments: Bilateral cerumen impaction appreciated, see procedure below.  Nose  External Nose: nares patent bilaterally; external nose normal;  Internal Nose: nasal mucosa normal; bilateral inferior turbinates normal;  Oral Cavity/Oropharynx  Lips: normal;  Teeth: tooth decay noted;  Gums: gingiva normal;  Tongue: normal;  Oral mucosa: normal;  Tonsils: bilateral tonsils absent,  Neck  Neck: neck normal; neck palpation normal;  Respiratory  Inspection: breathing unlabored; normal breathing rate;  Auscultation: breath sounds are clear;  Cardiovascular  Inspection: extremities are warm and well perfused; no peripheral edema present;  Auscultation: regular rate and rhythm;       Procedures   Diagnosis: Impacted cerumen  Indication:  The patient has impacted cerumen. Binocular microscopy and the use of expertise with picks and suctions was used to remove the  cerumen.  Procedure: Removal of impacted cerumen  Procedure in detail:  Verbal Consent obtained from patient/mother.  Cerumen removed from bilateral external auditory canal without complications. TM visualized and normal on the right. Small amount of residual cerumen on over the TM on the left.  The cerumen was thick/brown in nature.  The procedure took 20 minutes.  Patient tolerated the procedure well.    ASSESSMENT AND PLAN  Chin mass:  3cm superficial cystic benign-appearing chin mass. Favor epidermoid versus dermal inclusion cyst. Recommended surgical excision under general anesthesia and caregiver in agreement with proceeding. Will need to coordinate timing with injections for his hemophilia.  Risks/benefits discussed.    Cerumen impaction:  Disimpatction today with some residual deep cerumen on left side. Will order neomycin drops for 5 days. Plan to start debrox drops 10 days before surgery and will plan to finish cleaning and perform ear exam under anesthesia at same time as the excision of his chin lesion.    Triston Zhong MD PGY4  ENT    I saw and evaluated the patient. I personally obtained the key and critical portions of the history and physical exam or was physically present for key and critical portions performed by the resident/fellow. I reviewed the resident/fellow's documentation and discussed the patient with the resident/fellow. I agree with the resident/fellow's medical decision making as documented in the note.    Sayra Rosales MD

## 2024-08-01 DIAGNOSIS — D66 HEMOPHILIA A (MULTI): Primary | ICD-10-CM

## 2024-08-02 ENCOUNTER — OFFICE VISIT (OUTPATIENT)
Dept: CARDIOLOGY | Facility: HOSPITAL | Age: 44
End: 2024-08-02
Payer: MEDICARE

## 2024-08-02 VITALS
HEART RATE: 80 BPM | HEIGHT: 75 IN | RESPIRATION RATE: 16 BRPM | WEIGHT: 315 LBS | SYSTOLIC BLOOD PRESSURE: 115 MMHG | DIASTOLIC BLOOD PRESSURE: 80 MMHG | BODY MASS INDEX: 39.17 KG/M2

## 2024-08-02 DIAGNOSIS — I87.393 CHRONIC VENOUS HYPERTENSION WITH COMPLICATION INVOLVING BOTH SIDES: Primary | ICD-10-CM

## 2024-08-02 PROCEDURE — 99213 OFFICE O/P EST LOW 20 MIN: CPT | Performed by: INTERNAL MEDICINE

## 2024-08-02 PROCEDURE — 3079F DIAST BP 80-89 MM HG: CPT | Performed by: INTERNAL MEDICINE

## 2024-08-02 PROCEDURE — 3008F BODY MASS INDEX DOCD: CPT | Performed by: INTERNAL MEDICINE

## 2024-08-02 PROCEDURE — 3074F SYST BP LT 130 MM HG: CPT | Performed by: INTERNAL MEDICINE

## 2024-08-02 RX ORDER — EMICIZUMAB 150 MG/ML
1.5 INJECTION, SOLUTION SUBCUTANEOUS
Qty: 8 ML | Refills: 11 | Status: SHIPPED | OUTPATIENT
Start: 2024-08-02

## 2024-08-02 ASSESSMENT — PATIENT HEALTH QUESTIONNAIRE - PHQ9
SUM OF ALL RESPONSES TO PHQ9 QUESTIONS 1 AND 2: 0
2. FEELING DOWN, DEPRESSED OR HOPELESS: NOT AT ALL
1. LITTLE INTEREST OR PLEASURE IN DOING THINGS: NOT AT ALL

## 2024-08-02 ASSESSMENT — COLUMBIA-SUICIDE SEVERITY RATING SCALE - C-SSRS
1. IN THE PAST MONTH, HAVE YOU WISHED YOU WERE DEAD OR WISHED YOU COULD GO TO SLEEP AND NOT WAKE UP?: NO
2. HAVE YOU ACTUALLY HAD ANY THOUGHTS OF KILLING YOURSELF?: NO
6. HAVE YOU EVER DONE ANYTHING, STARTED TO DO ANYTHING, OR PREPARED TO DO ANYTHING TO END YOUR LIFE?: NO

## 2024-08-02 NOTE — PATIENT INSTRUCTIONS
ASSESSMENT/PLAN:    here for follow up CVI - doing well. Continue the compression. Continue to work on the weight loss. Continue to exercise. Elevate at night. Follow up annually.

## 2024-08-07 ENCOUNTER — OFFICE VISIT (OUTPATIENT)
Dept: OTOLARYNGOLOGY | Facility: HOSPITAL | Age: 44
End: 2024-08-07
Payer: MEDICARE

## 2024-08-07 VITALS — WEIGHT: 315 LBS | HEIGHT: 75 IN | BODY MASS INDEX: 39.17 KG/M2

## 2024-08-07 DIAGNOSIS — D66 HEMOPHILIA A (MULTI): ICD-10-CM

## 2024-08-07 DIAGNOSIS — L72.0 CYST OF SKIN AND SUBCUTANEOUS TISSUE: ICD-10-CM

## 2024-08-07 DIAGNOSIS — B07.9 BENIGN SKIN LESION EXCLUDING PLANTAR WART: ICD-10-CM

## 2024-08-07 DIAGNOSIS — L98.9 BENIGN SKIN LESION EXCLUDING PLANTAR WART: ICD-10-CM

## 2024-08-07 DIAGNOSIS — R22.0 FACIAL MASS: Primary | ICD-10-CM

## 2024-08-07 DIAGNOSIS — L57.0 BENIGN SKIN LESION EXCLUDING PLANTAR WART: ICD-10-CM

## 2024-08-07 DIAGNOSIS — L72.9 SKIN CYST: ICD-10-CM

## 2024-08-07 DIAGNOSIS — H61.23 BILATERAL IMPACTED CERUMEN: ICD-10-CM

## 2024-08-07 DIAGNOSIS — H66.90 ACUTE OTITIS MEDIA, UNSPECIFIED OTITIS MEDIA TYPE: ICD-10-CM

## 2024-08-07 PROCEDURE — 1036F TOBACCO NON-USER: CPT | Performed by: OTOLARYNGOLOGY

## 2024-08-07 PROCEDURE — 3008F BODY MASS INDEX DOCD: CPT | Performed by: OTOLARYNGOLOGY

## 2024-08-07 PROCEDURE — 69210 REMOVE IMPACTED EAR WAX UNI: CPT | Performed by: OTOLARYNGOLOGY

## 2024-08-07 PROCEDURE — 99214 OFFICE O/P EST MOD 30 MIN: CPT | Performed by: OTOLARYNGOLOGY

## 2024-08-07 PROCEDURE — 69210 REMOVE IMPACTED EAR WAX UNI: CPT | Mod: 50 | Performed by: OTOLARYNGOLOGY

## 2024-08-07 PROCEDURE — 99204 OFFICE O/P NEW MOD 45 MIN: CPT | Performed by: OTOLARYNGOLOGY

## 2024-08-07 RX ORDER — ACETAMINOPHEN 500 MG
TABLET ORAL
COMMUNITY
Start: 2024-07-30

## 2024-08-07 RX ORDER — NEOMYCIN SULFATE, POLYMYXIN B SULFATE, HYDROCORTISONE 3.5; 10000; 1 MG/ML; [USP'U]/ML; MG/ML
SOLUTION/ DROPS AURICULAR (OTIC)
Qty: 10 ML | Refills: 0 | Status: SHIPPED | OUTPATIENT
Start: 2024-08-07

## 2024-08-07 ASSESSMENT — PATIENT HEALTH QUESTIONNAIRE - PHQ9
1. LITTLE INTEREST OR PLEASURE IN DOING THINGS: NOT AT ALL
2. FEELING DOWN, DEPRESSED OR HOPELESS: NOT AT ALL
SUM OF ALL RESPONSES TO PHQ9 QUESTIONS 1 & 2: 0

## 2024-08-08 DIAGNOSIS — R22.0 FACIAL MASS: ICD-10-CM

## 2024-08-08 PROBLEM — L72.0 CYST OF SKIN AND SUBCUTANEOUS TISSUE: Status: ACTIVE | Noted: 2024-08-07

## 2024-08-08 PROBLEM — L98.9 BENIGN SKIN LESION EXCLUDING PLANTAR WART: Status: ACTIVE | Noted: 2024-08-07

## 2024-08-08 PROBLEM — L72.9 SKIN CYST: Status: ACTIVE | Noted: 2024-08-07

## 2024-08-08 PROBLEM — B07.9 BENIGN SKIN LESION EXCLUDING PLANTAR WART: Status: ACTIVE | Noted: 2024-08-07

## 2024-08-08 PROBLEM — L57.0 BENIGN SKIN LESION EXCLUDING PLANTAR WART: Status: ACTIVE | Noted: 2024-08-07

## 2024-08-14 ENCOUNTER — APPOINTMENT (OUTPATIENT)
Dept: HEMATOLOGY/ONCOLOGY | Facility: HOSPITAL | Age: 44
End: 2024-08-14
Payer: MEDICARE

## 2024-08-26 ENCOUNTER — ANESTHESIA EVENT (OUTPATIENT)
Dept: OPERATING ROOM | Facility: HOSPITAL | Age: 44
End: 2024-08-26
Payer: MEDICARE

## 2024-08-27 ENCOUNTER — HOSPITAL ENCOUNTER (OUTPATIENT)
Facility: HOSPITAL | Age: 44
Setting detail: OUTPATIENT SURGERY
Discharge: HOME | End: 2024-08-27
Attending: OTOLARYNGOLOGY | Admitting: OTOLARYNGOLOGY
Payer: MEDICARE

## 2024-08-27 ENCOUNTER — ANESTHESIA (OUTPATIENT)
Dept: OPERATING ROOM | Facility: HOSPITAL | Age: 44
End: 2024-08-27
Payer: MEDICARE

## 2024-08-27 VITALS
HEIGHT: 75 IN | RESPIRATION RATE: 20 BRPM | WEIGHT: 315 LBS | DIASTOLIC BLOOD PRESSURE: 84 MMHG | BODY MASS INDEX: 39.17 KG/M2 | OXYGEN SATURATION: 95 % | SYSTOLIC BLOOD PRESSURE: 135 MMHG | HEART RATE: 74 BPM | TEMPERATURE: 97 F

## 2024-08-27 DIAGNOSIS — L98.9 BENIGN SKIN LESION EXCLUDING PLANTAR WART: ICD-10-CM

## 2024-08-27 DIAGNOSIS — G89.18 POST-OP PAIN: ICD-10-CM

## 2024-08-27 DIAGNOSIS — L72.0 CYST OF SKIN AND SUBCUTANEOUS TISSUE: Primary | ICD-10-CM

## 2024-08-27 DIAGNOSIS — L57.0 BENIGN SKIN LESION EXCLUDING PLANTAR WART: ICD-10-CM

## 2024-08-27 DIAGNOSIS — B07.9 BENIGN SKIN LESION EXCLUDING PLANTAR WART: ICD-10-CM

## 2024-08-27 DIAGNOSIS — L72.9 SKIN CYST: ICD-10-CM

## 2024-08-27 PROCEDURE — 3600000003 HC OR TIME - INITIAL BASE CHARGE - PROCEDURE LEVEL THREE: Performed by: OTOLARYNGOLOGY

## 2024-08-27 PROCEDURE — 88304 TISSUE EXAM BY PATHOLOGIST: CPT | Performed by: PATHOLOGY

## 2024-08-27 PROCEDURE — 3600000008 HC OR TIME - EACH INCREMENTAL 1 MINUTE - PROCEDURE LEVEL THREE: Performed by: OTOLARYNGOLOGY

## 2024-08-27 PROCEDURE — 2500000005 HC RX 250 GENERAL PHARMACY W/O HCPCS: Mod: SE

## 2024-08-27 PROCEDURE — 7100000001 HC RECOVERY ROOM TIME - INITIAL BASE CHARGE: Performed by: OTOLARYNGOLOGY

## 2024-08-27 PROCEDURE — 7100000010 HC PHASE TWO TIME - EACH INCREMENTAL 1 MINUTE: Performed by: OTOLARYNGOLOGY

## 2024-08-27 PROCEDURE — 2500000005 HC RX 250 GENERAL PHARMACY W/O HCPCS: Mod: SE | Performed by: OTOLARYNGOLOGY

## 2024-08-27 PROCEDURE — 2500000005 HC RX 250 GENERAL PHARMACY W/O HCPCS: Mod: SE | Performed by: NURSE ANESTHETIST, CERTIFIED REGISTERED

## 2024-08-27 PROCEDURE — 2720000007 HC OR 272 NO HCPCS: Performed by: OTOLARYNGOLOGY

## 2024-08-27 PROCEDURE — A69210 PR REMOVAL IMPACTED CERUMEN INSTRUMENTATION UNILAT: Performed by: ANESTHESIOLOGY

## 2024-08-27 PROCEDURE — 3700000001 HC GENERAL ANESTHESIA TIME - INITIAL BASE CHARGE: Performed by: OTOLARYNGOLOGY

## 2024-08-27 PROCEDURE — 69210 REMOVE IMPACTED EAR WAX UNI: CPT | Performed by: OTOLARYNGOLOGY

## 2024-08-27 PROCEDURE — 2500000004 HC RX 250 GENERAL PHARMACY W/ HCPCS (ALT 636 FOR OP/ED): Mod: SE | Performed by: ANESTHESIOLOGY

## 2024-08-27 PROCEDURE — 0752T DGTZ GLS MCRSCP SLD LVL III: CPT | Mod: TC,SUR | Performed by: OTOLARYNGOLOGY

## 2024-08-27 PROCEDURE — 11426 EXC H-F-NK-SP B9+MARG >4 CM: CPT | Performed by: OTOLARYNGOLOGY

## 2024-08-27 PROCEDURE — 2500000004 HC RX 250 GENERAL PHARMACY W/ HCPCS (ALT 636 FOR OP/ED): Mod: SE

## 2024-08-27 PROCEDURE — 2500000004 HC RX 250 GENERAL PHARMACY W/ HCPCS (ALT 636 FOR OP/ED): Mod: SE | Performed by: NURSE ANESTHETIST, CERTIFIED REGISTERED

## 2024-08-27 PROCEDURE — 7100000002 HC RECOVERY ROOM TIME - EACH INCREMENTAL 1 MINUTE: Performed by: OTOLARYNGOLOGY

## 2024-08-27 PROCEDURE — A69210 PR REMOVAL IMPACTED CERUMEN INSTRUMENTATION UNILAT: Performed by: NURSE ANESTHETIST, CERTIFIED REGISTERED

## 2024-08-27 PROCEDURE — 7100000009 HC PHASE TWO TIME - INITIAL BASE CHARGE: Performed by: OTOLARYNGOLOGY

## 2024-08-27 PROCEDURE — 2780000003 HC OR 278 NO HCPCS: Performed by: OTOLARYNGOLOGY

## 2024-08-27 PROCEDURE — 69210 REMOVE IMPACTED EAR WAX UNI: CPT | Mod: 50 | Performed by: OTOLARYNGOLOGY

## 2024-08-27 PROCEDURE — RXMED WILLOW AMBULATORY MEDICATION CHARGE

## 2024-08-27 PROCEDURE — 3700000002 HC GENERAL ANESTHESIA TIME - EACH INCREMENTAL 1 MINUTE: Performed by: OTOLARYNGOLOGY

## 2024-08-27 RX ORDER — BACITRACIN 500 [USP'U]/G
OINTMENT TOPICAL AS NEEDED
Status: DISCONTINUED | OUTPATIENT
Start: 2024-08-27 | End: 2024-08-27 | Stop reason: HOSPADM

## 2024-08-27 RX ORDER — LIDOCAINE HYDROCHLORIDE 10 MG/ML
0.1 INJECTION INFILTRATION; PERINEURAL ONCE
Status: DISCONTINUED | OUTPATIENT
Start: 2024-08-27 | End: 2024-08-27 | Stop reason: HOSPADM

## 2024-08-27 RX ORDER — ONDANSETRON HYDROCHLORIDE 2 MG/ML
INJECTION, SOLUTION INTRAVENOUS AS NEEDED
Status: DISCONTINUED | OUTPATIENT
Start: 2024-08-27 | End: 2024-08-27

## 2024-08-27 RX ORDER — SODIUM CHLORIDE, SODIUM LACTATE, POTASSIUM CHLORIDE, CALCIUM CHLORIDE 600; 310; 30; 20 MG/100ML; MG/100ML; MG/100ML; MG/100ML
50 INJECTION, SOLUTION INTRAVENOUS CONTINUOUS
Status: DISCONTINUED | OUTPATIENT
Start: 2024-08-27 | End: 2024-08-27 | Stop reason: HOSPADM

## 2024-08-27 RX ORDER — HYDROMORPHONE HYDROCHLORIDE 1 MG/ML
0.5 INJECTION, SOLUTION INTRAMUSCULAR; INTRAVENOUS; SUBCUTANEOUS EVERY 5 MIN PRN
Status: DISCONTINUED | OUTPATIENT
Start: 2024-08-27 | End: 2024-08-27 | Stop reason: HOSPADM

## 2024-08-27 RX ORDER — ROCURONIUM BROMIDE 10 MG/ML
INJECTION, SOLUTION INTRAVENOUS AS NEEDED
Status: DISCONTINUED | OUTPATIENT
Start: 2024-08-27 | End: 2024-08-27

## 2024-08-27 RX ORDER — OXYCODONE HYDROCHLORIDE 5 MG/1
5 TABLET ORAL EVERY 4 HOURS PRN
Status: DISCONTINUED | OUTPATIENT
Start: 2024-08-27 | End: 2024-08-27 | Stop reason: HOSPADM

## 2024-08-27 RX ORDER — ACETAMINOPHEN 325 MG/1
650 TABLET ORAL EVERY 4 HOURS PRN
Status: DISCONTINUED | OUTPATIENT
Start: 2024-08-27 | End: 2024-08-27 | Stop reason: HOSPADM

## 2024-08-27 RX ORDER — SODIUM CHLORIDE 0.9 G/100ML
IRRIGANT IRRIGATION AS NEEDED
Status: DISCONTINUED | OUTPATIENT
Start: 2024-08-27 | End: 2024-08-27 | Stop reason: HOSPADM

## 2024-08-27 RX ORDER — ESMOLOL HYDROCHLORIDE 10 MG/ML
INJECTION INTRAVENOUS AS NEEDED
Status: DISCONTINUED | OUTPATIENT
Start: 2024-08-27 | End: 2024-08-27

## 2024-08-27 RX ORDER — HYDROMORPHONE HYDROCHLORIDE 1 MG/ML
0.2 INJECTION, SOLUTION INTRAMUSCULAR; INTRAVENOUS; SUBCUTANEOUS EVERY 5 MIN PRN
Status: DISCONTINUED | OUTPATIENT
Start: 2024-08-27 | End: 2024-08-27 | Stop reason: HOSPADM

## 2024-08-27 RX ORDER — ONDANSETRON HYDROCHLORIDE 2 MG/ML
4 INJECTION, SOLUTION INTRAVENOUS ONCE AS NEEDED
Status: DISCONTINUED | OUTPATIENT
Start: 2024-08-27 | End: 2024-08-27 | Stop reason: HOSPADM

## 2024-08-27 RX ORDER — ACETAMINOPHEN 500 MG
1000 TABLET ORAL EVERY 6 HOURS PRN
Qty: 30 TABLET | Refills: 0 | Status: SHIPPED | OUTPATIENT
Start: 2024-08-27 | End: 2024-09-01

## 2024-08-27 RX ORDER — PHENYLEPHRINE HCL IN 0.9% NACL 1 MG/10 ML
SYRINGE (ML) INTRAVENOUS AS NEEDED
Status: DISCONTINUED | OUTPATIENT
Start: 2024-08-27 | End: 2024-08-27

## 2024-08-27 RX ORDER — MIDAZOLAM HYDROCHLORIDE 1 MG/ML
INJECTION INTRAMUSCULAR; INTRAVENOUS AS NEEDED
Status: DISCONTINUED | OUTPATIENT
Start: 2024-08-27 | End: 2024-08-27

## 2024-08-27 RX ORDER — CLINDAMYCIN PHOSPHATE 900 MG/50ML
INJECTION, SOLUTION INTRAVENOUS AS NEEDED
Status: DISCONTINUED | OUTPATIENT
Start: 2024-08-27 | End: 2024-08-27

## 2024-08-27 RX ORDER — REMIFENTANIL HYDROCHLORIDE 1 MG/ML
INJECTION, POWDER, LYOPHILIZED, FOR SOLUTION INTRAVENOUS AS NEEDED
Status: DISCONTINUED | OUTPATIENT
Start: 2024-08-27 | End: 2024-08-27

## 2024-08-27 RX ORDER — LIDOCAINE HYDROCHLORIDE AND EPINEPHRINE 10; 10 MG/ML; UG/ML
INJECTION, SOLUTION INFILTRATION; PERINEURAL AS NEEDED
Status: DISCONTINUED | OUTPATIENT
Start: 2024-08-27 | End: 2024-08-27 | Stop reason: HOSPADM

## 2024-08-27 RX ORDER — LIDOCAINE HCL/PF 100 MG/5ML
SYRINGE (ML) INTRAVENOUS AS NEEDED
Status: DISCONTINUED | OUTPATIENT
Start: 2024-08-27 | End: 2024-08-27

## 2024-08-27 RX ORDER — PROPOFOL 10 MG/ML
INJECTION, EMULSION INTRAVENOUS AS NEEDED
Status: DISCONTINUED | OUTPATIENT
Start: 2024-08-27 | End: 2024-08-27

## 2024-08-27 RX ADMIN — SODIUM CHLORIDE, POTASSIUM CHLORIDE, SODIUM LACTATE AND CALCIUM CHLORIDE 50 ML/HR: 600; 310; 30; 20 INJECTION, SOLUTION INTRAVENOUS at 16:30

## 2024-08-27 ASSESSMENT — COLUMBIA-SUICIDE SEVERITY RATING SCALE - C-SSRS
6. HAVE YOU EVER DONE ANYTHING, STARTED TO DO ANYTHING, OR PREPARED TO DO ANYTHING TO END YOUR LIFE?: NO
1. IN THE PAST MONTH, HAVE YOU WISHED YOU WERE DEAD OR WISHED YOU COULD GO TO SLEEP AND NOT WAKE UP?: NO
2. HAVE YOU ACTUALLY HAD ANY THOUGHTS OF KILLING YOURSELF?: NO

## 2024-08-27 ASSESSMENT — PAIN - FUNCTIONAL ASSESSMENT
PAIN_FUNCTIONAL_ASSESSMENT: 0-10

## 2024-08-27 ASSESSMENT — PAIN SCALES - GENERAL
PAINLEVEL_OUTOF10: 0 - NO PAIN

## 2024-08-27 NOTE — PERIOPERATIVE NURSING NOTE
1630: Patient arrival to PACU, bedside report received from anesthesia and primary. Per pre-op nurse charting, patient didn't answer orientation questions appropriately and won't tell anybody his name and birthday when directly asked. Patient reliant on his mom to answer for him in pre-op. Mom is not present currently but neuro seems unchanged from previous charting. Incision with no signs of bleeding, no pain or nausea noted.     1717: Patient answering yes no questions appropriately going to send to phase 2 care.

## 2024-08-27 NOTE — DISCHARGE INSTRUCTIONS
Discharge Instructions:    Facial/Neck Incision Care:   - You have small paper strips called Steri-strips over your incision. These will fall off on their own, do not pull them off. You may shower in 48 hours but do not scrub your surgical site.  - Once steri strips have fallen off, cleanse all facial/neck incisions twice daily with baby shampoo or mild soap and water. Apply petroleum jelly/vaseline to incision line twice daily until incision has healed.     Diet:  - You may return to your previous diet    Pain:  - You may experience some moderate pain at the surgical site after your procedure. Please use Tylenol or Ibuprofen as needed for pain.      Please call Dr. Rosales's office with any questions or concerns.

## 2024-08-27 NOTE — ANESTHESIA PREPROCEDURE EVALUATION
Patient: Cuong Peña    Procedure Information       Date/Time: 08/27/24 1525    Procedure: Excision chin cyst    [place] [facility] [place] [facility]    Surgeons: Sayra Rosales MD            Relevant Problems   Anesthesia (within normal limits)      Neuro   (+) Seizure (Multi)      Liver   (+) Chronic hepatitis C virus infection (Multi)   (+) Cirrhosis (Multi)      Endocrine   (+) Morbid obesity (Multi)      Hematology   (+) Bleeding disorder (CMS-HCC)   (+) Hemophilia A (Multi)      Musculoskeletal   (+) DJD (degenerative joint disease)      HEENT  Chin mass.      ID   (+) AIDS (Multi)   (+) Acute URI   (+) Chronic hepatitis C virus infection (Multi)   (+) HIV disease (Multi)       Clinical information reviewed:   Tobacco  Allergies  Meds   Med Hx  Surg Hx   Fam Hx  Soc Hx        NPO Detail:  NPO/Void Status  Date of Last Liquid: 08/27/24  Time of Last Liquid: 1200  Date of Last Solid: 08/26/24  Time of Last Solid: 2359  Last Intake Type: Clear fluids         Physical Exam    Airway  Mallampati: IV     Cardiovascular    Dental    Pulmonary    Abdominal            Anesthesia Plan    History of general anesthesia?: yes  History of complications of general anesthesia?: no    ASA 3     general     intravenous induction   Anesthetic plan and risks discussed with patient and mother.    Plan discussed with resident.

## 2024-08-27 NOTE — OP NOTE
Excision chin cyst, Removal Cerumen Ear (B) Operative Note     Date: 2024  OR Location: [facility] OR    Name: Cuong Peña, : 1980, Age: 43 y.o., MRN: [id number], Sex: male    Diagnosis  Pre-op Diagnosis      * Skin cyst [L72.9]     * Cyst of skin and subcutaneous tissue [L72.0]     * Benign skin lesion excluding plantar wart [L98.9, B07.9, L57.0] Post-op Diagnosis     * Skin cyst [L72.9]     * Cyst of skin and subcutaneous tissue [L72.0]     * Benign skin lesion excluding plantar wart [L98.9, B07.9, L57.0]     Procedures  Excision chin cyst  55352 - HI EXC B9 LESION MRGN XCP SK TG S/N/H/F/G > 4.0CM    Removal Cerumen Ear - BILATERAL  62271 - HI REMOVAL IMPACTED CERUMEN INSTRUMENTATION BILATERAL    Surgeons      * Sayra Rosales - Primary    Resident/Fellow/Other Assistant:  Surgeons and Role:  Joey Car MD - Resident, Assisting    Procedure Summary  Anesthesia: General  ASA: III  Anesthesia Staff: Anesthesiologist: Luly Davenport MD  CRNA: FABIO Solano-CRNA  C-AA: SHASHI Roman  Estimated Blood Loss: 5mL  Intra-op Medications:   Administrations occurring from 1525 to 1730 on 24:   Medication Name Total Dose   lidocaine-epinephrine (Xylocaine W/EPI) 1 %-1:100,000 injection 10 mL   sodium chloride 0.9 % irrigation solution 1,000 mL              Anesthesia Record               Intraprocedure I/O Totals          Intake    LR bolus 600.00 mL    Remifentanil Drip 0.00 mL    The total shown is the total volume documented since Anesthesia Start was filed.    Total Intake 600 mL          Specimen:   ID Type Source Tests Collected by Time   1 : CHIN CYST Tissue SKIN CYST SURGICAL PATHOLOGY EXAM Sayra Rosales MD 2024 1469        Staff:   Circulator: Analisa Cadet Person: Rizwan Rodríguezub Person: Malini      Drains and/or Catheters: * None in log *    Tourniquet Times: n/a        Implants: N/a    Findings:   Significant cerumen impaction of left ear, moderate cerumen  impaction of right ear.   3 cm encapsulated cyst of subcutaneous tissue, sent for permanent pathology.    Indications: Cuong Peña is an 43 y.o. male who is having surgery for Skin cyst [L72.9]  Cyst of skin and subcutaneous tissue [L72.0]  Benign skin lesion excluding plantar wart [L98.9, B07.9, L57.0].  He was seen in clinic and diagnosed with bilateral cerumen impaction as well as a large chin cyst and recommended to undergo the above procedures.    The patient was seen in the preoperative area. The risks, benefits, complications, treatment options, non-operative alternatives, expected recovery and outcomes were discussed with the patient. The possibilities of reaction to medication, pulmonary aspiration, injury to surrounding structures, bleeding, recurrent infection, the need for additional procedures, failure to diagnose a condition, and creating a complication requiring transfusion or operation were discussed with the patient. The patient concurred with the proposed plan, giving informed consent.  The site of surgery was properly noted/marked if necessary per policy. The patient has been actively warmed in preoperative area. Preoperative antibiotics have been ordered and given within 1 hours of incision. Venous thrombosis prophylaxis have been ordered including bilateral sequential compression devices    Procedure Details:   The patient was seen in the preoperative setting. The patient was then taken back to the operating room and transferred to the operating room table. A preoperative timeout was then performed by Dr. Rosales. The patient was then successfully sedated and intubated by the anesthesia team. The head of the bed was then rotated 90 degrees.     At this time, operating microscope was brought into the room. Speculum was placed inside the left ear, and impacted cerumen was removed using ear pick and gentle suction. Left ear was significantly impacted, with semi-soft cerumen. Same was repeated  for the right ear, which contained a mild to moderate amount of cerumen, removed with an alligator.     Then, the patient was then prepped and draped in a sterile manner.     A linear incision was made over the region of the cyst with a 15 blade through the dermis. Dissection was carefully continued around the cyst with iris scissors. Care was taken not to violate the capsule of the cyst. The cyst was sent for permanent pathology.    Hemostasis was achieved using bipolar cautery, and Floseal was placed in the excision cavity. The deep dermis was reapproximated using 3-0 vicryl suture and the skin was closed in a subcuticular fashion with 4-0 monocryl. The incision was covered with steri strips. This concluded the procedure.          The patient was then handed over the anesthesia team and successfully extubated. The patient was taken to the postoperative anesthesia care unit. The patient tolerated the procedure well and there were no complications. Dr. Rosales was present for the entirety of the procedure.    Complications:  None; patient tolerated the procedure well.    Disposition: PACU - hemodynamically stable.  Condition: stable     This procedure was not performed to treat primary cutaneous melanoma through wide local excision      Additional Details: n/a    Attending Attestation: I was present for the entirety of the procedure(s).     Sayra Rosales  Phone Number: [phone]

## 2024-08-28 ENCOUNTER — PHARMACY VISIT (OUTPATIENT)
Dept: PHARMACY | Facility: CLINIC | Age: 44
End: 2024-08-28
Payer: COMMERCIAL

## 2024-08-28 PROCEDURE — RXOTC WILLOW AMBULATORY OTC CHARGE

## 2024-08-29 NOTE — ANESTHESIA POSTPROCEDURE EVALUATION
Patient: Cuong Peña    Procedure Summary       Date: 08/27/24 Room / Location: [facility] OR 03 / [facility] OR    Anesthesia Start: 1506 Anesthesia Stop: 1638    Procedures:       Excision chin cyst      Removal Cerumen Ear (Bilateral) Diagnosis:       Skin cyst      Cyst of skin and subcutaneous tissue      Benign skin lesion excluding plantar wart      (Skin cyst [L72.9])      (Cyst of skin and subcutaneous tissue [L72.0])      (Benign skin lesion excluding plantar wart [L98.9, B07.9, L57.0])    Surgeons: Sayra Rosales MD Responsible Provider: Altaf Ayala MD    Anesthesia Type: general ASA Status: 3            Anesthesia Type: general    Vitals Value Taken Time   /80 08/27/24 1715   Temp 36.1 °C (97 °F) 08/27/24 1715   Pulse 69 08/27/24 1715   Resp 21 08/27/24 1715   SpO2 93 % 08/27/24 1715       Anesthesia Post Evaluation    Patient participation: complete - patient participated  Level of consciousness: awake  Pain management: adequate  Airway patency: patent  Cardiovascular status: acceptable  Respiratory status: acceptable  Hydration status: acceptable  Postoperative Nausea and Vomiting: none    There were no known notable events for this encounter.

## 2024-09-04 LAB
LABORATORY COMMENT REPORT: NORMAL
PATH REPORT.FINAL DX SPEC: NORMAL
PATH REPORT.GROSS SPEC: NORMAL
PATH REPORT.RELEVANT HX SPEC: NORMAL
PATH REPORT.TOTAL CANCER: NORMAL

## 2024-09-06 NOTE — PROGRESS NOTES
Chief Complaint   Patient presents with    Follow-up     HPI:  Cuong Peña is a 43 y.o. male following up with me today for his first post op visit. He is s/p excision of chin cyst and cerumen removal on 8/27/24. Path returned Epidermal inclusion cyst.  He is doing great.  He got his infusion today.  No bleeding.  Here with his mom/guardian.    Social History  He reports that he has never smoked. He has never used smokeless tobacco. He reports that he does not drink alcohol and does not use drugs.    ROS:  Review of Systems   Constitutional:  Negative for appetite change, chills, fatigue, fever and unexpected weight change.   HENT:  Negative for dental problem, drooling, ear pain, facial swelling, hearing loss, mouth sores, sore throat, tinnitus, trouble swallowing and voice change.    Respiratory:  Negative for cough, shortness of breath and stridor.    Gastrointestinal:  Negative for nausea and vomiting.   Musculoskeletal:  Negative for neck pain.   Hematological:  Negative for adenopathy.      PE:  ENT Physical Exam  Constitutional  Appearance: patient appears well-developed,  Communication/Voice: Communication comments: Minimal speech, developmentally delayed  Head and Face  Appearance: head appears normal and face appears normal;  Head and Face comments: Right chin steristrips removed.  Sutures removed  Ear  Auricles: right auricle normal; left auricle normal;  Nose  Internal Nose: nasal mucosa normal; septum normal;  Oral Cavity/Oropharynx  Gums: gingiva normal;  Tongue: normal;  Oral mucosa: normal;  Neck  Neck: normal trachea;  Neck comments: See chin incision above  Respiratory  Inspection: breathing unlabored;  Cardiovascular  Inspection: extremities are warm and well perfused;  Neurovestibular  Mental Status: alert and oriented;  Psychiatric: mood normal; affect is appropriate;  Cranial Nerves: cranial nerves intact;       Procedures     ASSESSMENT AND PLAN:  Problem List Items Addressed This Visit        Epidermal inclusion cyst - Primary    Current Assessment & Plan     Reassurance provided.  Education provided regarding incision care  Follow up as needed           Sayra Rosales MD    Head & Neck Surgical Oncology & Reconstruction  Department of Otolaryngology - Head and Neck Surgery     By signing my name below, I, Anne Bravoibe, attest that this documentation has been prepared under the direction and in the presence of Dr. Sayra Rosales MD.     All medical record entries made by the Scribe were at my direction and personally dictated by me, Dr. Sayra Rosales. I have reviewed the chart and agree that the record accurately reflects my personal performance of the history, physical exam, discussion and plan.

## 2024-09-11 ENCOUNTER — OFFICE VISIT (OUTPATIENT)
Dept: OTOLARYNGOLOGY | Facility: HOSPITAL | Age: 44
End: 2024-09-11
Payer: MEDICARE

## 2024-09-11 VITALS — WEIGHT: 315 LBS | TEMPERATURE: 98.1 F | HEIGHT: 75 IN | BODY MASS INDEX: 39.17 KG/M2

## 2024-09-11 DIAGNOSIS — L72.0 EPIDERMAL INCLUSION CYST: Primary | ICD-10-CM

## 2024-09-11 PROCEDURE — 99211 OFF/OP EST MAY X REQ PHY/QHP: CPT | Performed by: OTOLARYNGOLOGY

## 2024-09-11 PROCEDURE — 3008F BODY MASS INDEX DOCD: CPT | Performed by: OTOLARYNGOLOGY

## 2024-09-11 PROCEDURE — 1036F TOBACCO NON-USER: CPT | Performed by: OTOLARYNGOLOGY

## 2024-09-11 ASSESSMENT — ENCOUNTER SYMPTOMS
FATIGUE: 0
VOMITING: 0
NAUSEA: 0
FEVER: 0
TROUBLE SWALLOWING: 0
APPETITE CHANGE: 0
SORE THROAT: 0
NECK PAIN: 0
CHILLS: 0
VOICE CHANGE: 0
FACIAL SWELLING: 0
SHORTNESS OF BREATH: 0
STRIDOR: 0
UNEXPECTED WEIGHT CHANGE: 0
COUGH: 0
ADENOPATHY: 0

## 2024-09-11 ASSESSMENT — PATIENT HEALTH QUESTIONNAIRE - PHQ9
2. FEELING DOWN, DEPRESSED OR HOPELESS: NOT AT ALL
SUM OF ALL RESPONSES TO PHQ9 QUESTIONS 1 AND 2: 0
1. LITTLE INTEREST OR PLEASURE IN DOING THINGS: NOT AT ALL

## 2024-09-11 ASSESSMENT — PAIN SCALES - GENERAL: PAINLEVEL: 0-NO PAIN

## 2024-11-26 DIAGNOSIS — R56.9 SEIZURE (MULTI): ICD-10-CM

## 2024-11-26 DIAGNOSIS — E55.9 VITAMIN D DEFICIENCY: Primary | ICD-10-CM

## 2024-11-27 RX ORDER — ACETAMINOPHEN 500 MG
TABLET ORAL DAILY
Qty: 60 CAPSULE | Refills: 5 | Status: SHIPPED | OUTPATIENT
Start: 2024-11-27

## 2024-12-03 RX ORDER — LEVETIRACETAM 1000 MG/1
1000 TABLET ORAL 2 TIMES DAILY
Qty: 60 TABLET | Refills: 10 | Status: SHIPPED | OUTPATIENT
Start: 2024-12-03

## 2025-01-03 ENCOUNTER — TELEPHONE (OUTPATIENT)
Dept: ADMISSION | Facility: HOSPITAL | Age: 45
End: 2025-01-03
Payer: COMMERCIAL

## 2025-01-03 DIAGNOSIS — M36.2 HEMOPHILIC ARTHROPATHY (MULTI): ICD-10-CM

## 2025-01-03 DIAGNOSIS — D66 HEMOPHILIC ARTHROPATHY (MULTI): ICD-10-CM

## 2025-01-06 RX ORDER — CELECOXIB 100 MG/1
100 CAPSULE ORAL 2 TIMES DAILY
Qty: 180 CAPSULE | Refills: 3 | Status: SHIPPED | OUTPATIENT
Start: 2025-01-06

## 2025-01-07 ENCOUNTER — DOCUMENTATION (OUTPATIENT)
Dept: PEDIATRIC HEMATOLOGY/ONCOLOGY | Facility: HOSPITAL | Age: 45
End: 2025-01-07
Payer: COMMERCIAL

## 2025-01-07 NOTE — RESEARCH NOTES
Late entry    An IRB-approved patient kenzie for [facility] Data Set (03-) was mailed to Coung Peña on 12/20/2024. The purpose of this kenzie, dated 10/29/2024, was to inform the study participant that the PI has changed from Dr. Gregorio Unger to Mr. Kennedy Escalante CNP. This kenzie includes updated contact information in the event the patient would like to withdraw permission for the research team to use their protected health information.

## 2025-01-23 ENCOUNTER — APPOINTMENT (OUTPATIENT)
Dept: IMMUNOLOGY | Facility: CLINIC | Age: 45
End: 2025-01-23
Payer: COMMERCIAL

## 2025-01-28 DIAGNOSIS — Z21 HIV INFECTION, UNSPECIFIED SYMPTOM STATUS: ICD-10-CM

## 2025-01-30 ENCOUNTER — TELEPHONE (OUTPATIENT)
Dept: IMMUNOLOGY | Facility: CLINIC | Age: 45
End: 2025-01-30
Payer: MEDICARE

## 2025-01-30 RX ORDER — BICTEGRAVIR SODIUM, EMTRICITABINE, AND TENOFOVIR ALAFENAMIDE FUMARATE 50; 200; 25 MG/1; MG/1; MG/1
1 TABLET ORAL DAILY
Qty: 30 TABLET | Refills: 5 | Status: SHIPPED | OUTPATIENT
Start: 2025-01-30

## 2025-01-30 NOTE — TELEPHONE ENCOUNTER
Pt's mother calls with questions about OHDAP. Per Pt he was told by pharmacy that he has a copay for his ART. Per Pt's mother Pt has [employer] as Part D, however he also has Extra Help and Medicaid? Sw and Pt's mother discuss OHDAP. Pt has upcoming MD appt on 2/6. Pt's mother to bring Pt's 2025 award letter, [employer] ID, as well as updated photo ID. Sw will assist with OHDAP application to cover copays while investigating reason for copay.

## 2025-02-04 ENCOUNTER — DOCUMENTATION (OUTPATIENT)
Dept: PRIMARY CARE | Facility: HOSPITAL | Age: 45
End: 2025-02-04
Payer: MEDICARE

## 2025-02-04 ENCOUNTER — OFFICE VISIT (OUTPATIENT)
Dept: PRIMARY CARE | Facility: HOSPITAL | Age: 45
End: 2025-02-04
Payer: MEDICARE

## 2025-02-04 VITALS
OXYGEN SATURATION: 96 % | HEART RATE: 95 BPM | HEIGHT: 75 IN | DIASTOLIC BLOOD PRESSURE: 93 MMHG | WEIGHT: 315 LBS | SYSTOLIC BLOOD PRESSURE: 128 MMHG | TEMPERATURE: 96.7 F | BODY MASS INDEX: 39.17 KG/M2

## 2025-02-04 DIAGNOSIS — Z76.89 ENCOUNTER TO ESTABLISH CARE: Primary | ICD-10-CM

## 2025-02-04 PROCEDURE — 3008F BODY MASS INDEX DOCD: CPT | Performed by: INTERNAL MEDICINE

## 2025-02-04 PROCEDURE — 99215 OFFICE O/P EST HI 40 MIN: CPT | Mod: GC

## 2025-02-04 PROCEDURE — 99215 OFFICE O/P EST HI 40 MIN: CPT | Performed by: INTERNAL MEDICINE

## 2025-02-04 PROCEDURE — 1036F TOBACCO NON-USER: CPT | Performed by: INTERNAL MEDICINE

## 2025-02-04 ASSESSMENT — ENCOUNTER SYMPTOMS
ABDOMINAL PAIN: 0
CONSTIPATION: 0
AGITATION: 0
FEVER: 0
LOSS OF SENSATION IN FEET: 0
NAUSEA: 0
OCCASIONAL FEELINGS OF UNSTEADINESS: 0
COUGH: 0
DEPRESSION: 0
DIARRHEA: 0
PALPITATIONS: 0
DYSURIA: 0
APPETITE CHANGE: 0
SHORTNESS OF BREATH: 0
ACTIVITY CHANGE: 0
VOMITING: 0
BRUISES/BLEEDS EASILY: 0
CHILLS: 0
HEADACHES: 0

## 2025-02-04 ASSESSMENT — PAIN SCALES - GENERAL: PAINLEVEL_OUTOF10: 0-NO PAIN

## 2025-02-04 NOTE — PROGRESS NOTES
Advanced care planning discussed at this visit.  Patient does not currently have a Healthcare Power of  or Living Will in place. Patient has a legal guardian, his mother, Frieda Peña. She is to make medical decisions for the patient in the event of an emergency. Patient's legal guardian declined information and documentation on POA and Living Will at this time.

## 2025-02-04 NOTE — PROGRESS NOTES
I saw and evaluated the patient. I personally obtained the key and critical portions of the history and physical exam or was physically present for key and critical portions performed by the resident/fellow. I reviewed the resident/fellow's documentation and discussed the patient with the resident/fellow. I agree with the resident/fellow's medical decision making as documented in the note.    Sol Dawn MD

## 2025-02-04 NOTE — PATIENT INSTRUCTIONS
Jaspalluci Cuong     You were seen today for the first time in the [facility]. As discussed today, our plan is:     Continue taking your medications as instructed  Continue following up with the doctors taking care of the seizures, hemophilia, cirrhosis, and HIV infection  It seems like you have gained some weight recently, however we discussed with your mom that we need to work slowly on this issue.    Please come back to see us in 6 months.     It was a pleasure taking care of you today !  The [facility] team     ------  If you have any problems or questions, please contact the clinic at [phone] to leave a message. Our fax number is [phone]. If your issue cannot wait until the next business day, please go to urgent care or the emergency department.     I also strongly urge all of my patients to register for [facility] by going to: [email or web address]  (The  staff can also send you a text/email link to register when you check out).    No shows: It is understandable if you are unable to make it to a visit, but please cancel your appointment instead of not showing up. This helps to give other patients access to primary care and keeps wait times low.        [facility]   [phone]

## 2025-02-04 NOTE — PROGRESS NOTES
[facility]      Chief complaint: visit to [facility]    HPI:  Cuong Peña is a 44 y.o. male patient with PMHx of epilepsy, intellectual disability, hemophilia A, HIV infection, liver cirrhosis due to chronic hepatitis C, presenting to establish care.     Patient is presenting with his mother who is his legal guardian and primary caregiver.   During today's visit, patient is doing well. He is alert, oriented x3, minimally verbal, able to obey commands. He lives with his mother at home. Cuong usually goes to [facility] Monday to Friday from 8-9 AM to 3:30 PM. In his free time, he likes to play games on his Ipad and rides his bike almost daily. Mom reports that patient is usually calm at home, denies episodes of agitation or insomnia. Mom usually schedules his meals (breakfast, lunch, snack, and dinner) and describes a balanced diet. Reports that her oven is currently broken therefore she is frying his meals currently and is worried that this might contribute to him gaining weight. She accompanies him during when he rides his bike and makes sure he doesn't fall. Medication list was review with mom, reports compliance with all medications. She is a very reliable caregiver.       Review of systems:  Review of Systems   Constitutional:  Negative for activity change, appetite change, chills and fever.   Respiratory:  Negative for cough and shortness of breath.    Cardiovascular:  Negative for chest pain and palpitations.   Gastrointestinal:  Negative for abdominal pain, constipation, diarrhea, nausea and vomiting.        Patient has at least 1 bowel movement per day.   Genitourinary:  Negative for dysuria.   Neurological:  Negative for headaches.        No episodes of seizures during the last 10 years   Hematological:  Does not bruise/bleed easily.        No episodes of bleeding during the past year   Psychiatric/Behavioral:  Negative for agitation and behavioral  problems.         Past medical history:  Past Medical History:   Diagnosis Date    Cyst of kidney, acquired     Kidney cysts    Encounter for immunization 10/09/2020    Need for vaccination    Flat foot (pes planus) (acquired), unspecified foot     Flat foot, acquired    Pain in unspecified ankle and joints of unspecified foot 10/07/2013    Ankle joint pain    Personal history of other diseases of the digestive system     Personal history of cirrhosis    Personal history of other diseases of the nervous system and sense organs     Personal history of otitis media    Personal history of other drug therapy 10/21/2016    History of influenza vaccination    Personal history of other endocrine, nutritional and metabolic disease 05/02/2019    History of morbid obesity    Personal history of other endocrine, nutritional and metabolic disease     History of obesity    Personal history of other infectious and parasitic diseases 10/07/2021    History of hepatitis C virus infection    Unspecified open wound, left foot, initial encounter     Open wound of left foot       Surgical history:  Past Surgical History:   Procedure Laterality Date    ANKLE SURGERY  01/29/2014    Ankle Surgery    TONSILLECTOMY  10/07/2013    Tonsillectomy       Family history:  Family History   Problem Relation Name Age of Onset    Cataracts Mother      Diabetes Mother         Medications:  Current Outpatient Medications   Medication Instructions    aminocaproic acid (AMICAR ORAL) 25 mg, oral, As needed    antihemophilic factor rcmb PEG (Adynovate) 250 (+/-) unit solution 50 Units/kg, intravenous, As needed, On Mondays and Thursdays    antihemophilic RF VIII (Altuviiio) 4000 (+/-) unit recon soln 7,750 Units, intravenous, Once, Prior to surgery    Biktarvy -25 mg tablet 1 tablet, oral, Daily    celecoxib (CELEBREX) 100 mg, oral, 2 times daily    cholecalciferol (Vitamin D-3) 50 mcg (2,000 unit) capsule oral, Daily    Hemlibra 1.5 mg/kg,  subcutaneous, Every 7 days, Disp 4 doses.  Include ancillary supplies.  11 refills    levETIRAcetam (KEPPRA) 1,000 mg, oral, 2 times daily    neomycin-polymyxin-HC (Cortisporin) otic solution 3 drops into both ears three times daily for 5 days       Refill needed: no    Allergies:  Allergies   Allergen Reactions    Cefaclor Unknown and Seizure       Health maintenance:  Health Maintenance   Topic Date Due    Medicare Annual Wellness Visit (AWV)  Never done    Colorectal Cancer Screening  Never done    Meningococcal Vaccine (1 - Risk 2-dose series) Never done    MMR Vaccines (1 of 2 - Risk 2-dose series) Never done    Zoster Vaccines (1 of 2) Never done    Hepatitis B Vaccines (3 of 3 - 19+ 3-dose series) 05/03/2012    Hepatitis A Vaccines (3 of 3 - Hep A Twinrix risk 3-dose series) 08/08/2012    Diabetes Screening  06/28/2023    Influenza Vaccine (1) 09/01/2024    COVID-19 Vaccine (6 - 2024-25 season) 09/01/2024    Lipid Panel  09/29/2028    Pneumococcal Vaccine: Pediatrics and At-Risk Adult Patients (4 of 4 - PCV20 or PCV21) 09/11/2029    DTaP/Tdap/Td Vaccines (3 - Td or Tdap) 11/07/2029    HIB Vaccines  Aged Out    IPV Vaccines  Aged Out    Rotavirus Vaccines  Aged Out    HPV Vaccines  Aged Out     Social history:   reports that he has never smoked. He has never used smokeless tobacco. He reports that he does not drink alcohol and does not use drugs.  Living situation: lives at home with mom  Tobacco: no  Alcohol: no  Other drugs: no  Not sexually active    Vitals:    Vitals:    02/04/25 0806   BP: (!) 128/93   Pulse: 95   Temp: 35.9 °C (96.7 °F)   SpO2: 96%         Physical exam:  Physical Exam  Constitutional:       Appearance: Normal appearance.   Cardiovascular:      Rate and Rhythm: Normal rate and regular rhythm.   Pulmonary:      Effort: Pulmonary effort is normal.      Breath sounds: Normal breath sounds.   Abdominal:      General: There is no distension.      Palpations: Abdomen is soft.      Tenderness:  "There is no abdominal tenderness.   Musculoskeletal:      Comments: Patient is wearing compressing stockings in both legs   Neurological:      Mental Status: He is alert and oriented to person, place, and time.         Labs:  Lab Results   Component Value Date    WBC 3.4 (L) 06/26/2024    HGB 14.8 06/26/2024    HCT 41.8 06/26/2024    MCV 89 06/26/2024     (L) 06/26/2024       Lab Results   Component Value Date    GLUCOSE 105 (H) 06/26/2024    CALCIUM 8.9 06/26/2024     06/26/2024    K 4.0 06/26/2024    CO2 26 06/26/2024     06/26/2024    BUN 8 06/26/2024    CREATININE 1.02 06/26/2024       Lab Results   Component Value Date    HGBA1C 5.1 06/28/2022        Lab Results   Component Value Date    CHOL 160 09/29/2023    CHOL 159 10/27/2022    CHOL 165 06/08/2022     Lab Results   Component Value Date    HDL 48.8 09/29/2023    HDL 46.0 10/27/2022    HDL 46.3 06/08/2022     No results found for: \"LDLCALC\"  Lab Results   Component Value Date    TRIG 53 09/29/2023    TRIG 70 10/27/2022    TRIG 48 06/08/2022     No components found for: \"CHOLHDL\"    Imaging:  No results found.    Assessment and plan:  Cuong Peña is a 44 y.o. male patient with PMHx of epilepsy, intellectual disability, hemophilia A, HIV infection, liver cirrhosis due to chronic hepatitis C, presenting to establish care.     #Epilepsy  #Intellectual disability  -As per patient's mother, Cuong had normal development until age 1. At age 1 patient had a generalized tonic clonic seizure. After that, patient started having progressive developmental delay.  -Follows with neurology  -He was started on phenobarbital then switched to some other agents at some point which she does not remember. He has been on Oxcarbazepine for many years until 2015. His HIV doctor had wished to switch him to another medication in order to have less interactions with his HIV medications. He was admitted to the EMU in 2015 where his EEG was normal and the " Trileptal was stopped and he was switched to Keppra 1000 mg BID.   -Seizures currently well controlled (seizure free for the past 10 years) on Keppra 1000mg BID  -Mother is legal guardian and primary caregiver.   -Currently patient is alert, oriented x3, minimally verbal, able to obey commands.  -Goes to [facility] Monday to Friday from 8-9 AM to 3:30 PM. In his free time, he likes to play games on his Ipad and rides his bike almost daily. Mom reports that patient is usually calm at home, denies episodes of agitation or insomnia.    #Hemophilia A  -As per patient's mother, no family hx of hemophilia or bleeding disorders. Cuong was diagnosed with hemophilia in early childhood after he had a fall with subsequent scalp hematoma that took around 1 year to resolve. Patient also had history of tonsillectomy in childhood at age 4 which was complicated by bleeding requiring transfusion.   -Follows with hematology (Dr. Tejada, last visit on 06/2024)  -Last labs (06/2024): Hb 14.8, Plt 148, INR 1.1, PTT 24, folate 8.3, TIBC 260, iron saturation 44. Factor VIII activity 47% (1/2023)  -Bleeding disorder well controlled. No bleeds during the past year.  -On Hemalibra (emicizumab) weekly injection, given by mom at home  -Has oral Amicar (aminocarpoic acid) PRN and Adynovate (antihemophilic factor) PRN - however both were not required recently since didn't have bleeds  -Enrolled into [employer] free trial (antihemophilic RF VIII) that can be used PRN prepreocedure     #LLE swelling  #Left arm bruises and deformity  -Hx of left ankle surgery (2014) and left elbow surgery due to intra-articular bleed  -Patient has subsequent left lower extremity venous insufficiency  -Follows with vascular medicine (annual follow up, last seen on 2/2024)  -Uses bilateral compression stockings   -On Celecoxib 100 BID scheduled     #HIV infection  -Remote history of tonsillectomy at age 4, which was complicated with bleeding due  to hemophilia, received blood transfusion contaminated with HIV and hepatitis C virus.   -Follows with ID (Dr. Larson, last visit on 07/2024)  -Last labs (06/2024): CD4 absolute count 391, HIV RNA undetectable  -On Biktarvy -25 1 tab daily    #Liver cirrhosis  #Chronic hepatitis C   -Remote history of tonsillectomy at age 4, which was complicated with bleeding due to hemophilia, received blood transfusion contaminated with HIV and hepatitis C virus.   -Follows with Dr. Grant (last visit in chart 06/2022)  -HCV infection cured (as per chart)  -Labs and liver US every 6 months for assessment for hepatocellular carcinoma  Last labs (06/2024): AST 24, ALT 22, Alk phos 86, kortney total 0.9, albumin 3.8, Plt 148, INR 1.1  Last AFP <4 (06/2022)  Last liver US (01/2024): Cirrhotic morphology of the liver without focal hepatic lesion, similar to prior. Previously described hypoechoic lesion adjacent to the left lobe liver is not identified on this exam.  -EGD every 3 years for assessment of esophageal varices  -Low salt diet <2g sodium per day    #Obesity  -BMI 44.87  -Mom reports that patient has been gaining weight recently  -Mom usually schedules his meals (breakfast, lunch, snack, and dinner) and describes a balanced diet. Reports that her oven is currently broken therefore she is frying his meals currently and is worried that this might contribute to him gaining weight.   -Patient exercises daily (rides bike)  -Will need progressive lifestyle modification  -Last A1c 5.1 (6/2022)    #Vit D deficiency  -Last vit D level 64 (3/2023)  -On Vit D 2000 units 2 capsules daily    #Epidermal inclusion cyst  #Cerumen impaction  -Patient had progressively enlarging R chin mass. Imaging on  6/2024 showed a 3.8 cm heterogenous fluid collection favored to represent a hemorrhagic or debris-filled cyst.   -Seen by ENT (Dr. Rosales)  -s/p excision of chin cyst on 8/27/24. Pathology returned Epidermal inclusion cyst.   -s/p cerumen  removal on 8/27/24. Received Neomycin and Debrox ear drops.  -Received injections for his hemophilia at adequate timing  -No post op bleeding complications      HEALTH MAINTENANCE     Vaccines:  Influenza: mother reports that patient took influenza vaccine on September 2024  COVID: received 5 doses of Covid vaccine, last 06/2022  Tdap: last Tdap in 2019, next due on 2029  Pneumococcal vaccine (adult <65 w/ risk factors eg, smoking, diabetes, heart/liver/lung disease; or adult >65: PCV 21, PCV 20, or PCV15 followed by PPSV23): receive Pneumococcal vaccine last dose on 2022         Follow-up in 6 months    Patient and plan discussed with attending physician Dr. Dawn.    Sharon Villalpando MD

## 2025-02-06 ENCOUNTER — OFFICE VISIT (OUTPATIENT)
Dept: IMMUNOLOGY | Facility: CLINIC | Age: 45
End: 2025-02-06
Payer: MEDICARE

## 2025-02-06 ENCOUNTER — DOCUMENTATION (OUTPATIENT)
Dept: IMMUNOLOGY | Facility: CLINIC | Age: 45
End: 2025-02-06
Payer: MEDICARE

## 2025-02-06 DIAGNOSIS — E55.9 VITAMIN D DEFICIENCY: ICD-10-CM

## 2025-02-06 DIAGNOSIS — B20 AIDS (MULTI): Primary | ICD-10-CM

## 2025-02-06 DIAGNOSIS — K74.69 OTHER CIRRHOSIS OF LIVER: ICD-10-CM

## 2025-02-06 DIAGNOSIS — D66 HEMOPHILIA A (MULTI): ICD-10-CM

## 2025-02-06 DIAGNOSIS — R56.9 SEIZURE (MULTI): ICD-10-CM

## 2025-02-06 PROCEDURE — 99214 OFFICE O/P EST MOD 30 MIN: CPT | Performed by: INTERNAL MEDICINE

## 2025-02-06 PROCEDURE — 1036F TOBACCO NON-USER: CPT | Performed by: INTERNAL MEDICINE

## 2025-02-06 RX ORDER — ACETAMINOPHEN 500 MG
2000 TABLET ORAL DAILY
Qty: 60 CAPSULE | Refills: 6 | Status: SHIPPED | OUTPATIENT
Start: 2025-02-06

## 2025-02-06 ASSESSMENT — ENCOUNTER SYMPTOMS
TREMORS: 0
CONSTITUTIONAL NEGATIVE: 1
DIZZINESS: 0
HYPERACTIVE: 0
CHILLS: 0
FLANK PAIN: 0
EYES NEGATIVE: 1
ABDOMINAL PAIN: 0
NAUSEA: 0
EYE PAIN: 0
BACK PAIN: 0
COUGH: 0
HEADACHES: 0
PSYCHIATRIC NEGATIVE: 1
FREQUENCY: 0
SEIZURES: 0
CARDIOVASCULAR NEGATIVE: 1
ENDOCRINE NEGATIVE: 1
DYSURIA: 0
DIAPHORESIS: 0
MYALGIAS: 0
APPETITE CHANGE: 0
RECTAL PAIN: 0
SLEEP DISTURBANCE: 0
NERVOUS/ANXIOUS: 0
WOUND: 0
CONSTIPATION: 0
ANAL BLEEDING: 0
EYE ITCHING: 0
VOMITING: 0
NECK PAIN: 0
WHEEZING: 0
LIGHT-HEADEDNESS: 0
SPEECH DIFFICULTY: 0
DYSPHORIC MOOD: 0
ARTHRALGIAS: 0
ALLERGIC/IMMUNOLOGIC NEGATIVE: 1
COLOR CHANGE: 0
ABDOMINAL DISTENTION: 0
HEMATOLOGIC/LYMPHATIC NEGATIVE: 1
EYE REDNESS: 0
GASTROINTESTINAL NEGATIVE: 1
MUSCULOSKELETAL NEGATIVE: 1
SINUS PRESSURE: 0
EYE DISCHARGE: 0
SINUS PAIN: 0
NEUROLOGICAL NEGATIVE: 1
SORE THROAT: 0
JOINT SWELLING: 0
SHORTNESS OF BREATH: 0
NECK STIFFNESS: 0
DIFFICULTY URINATING: 0
HEMATURIA: 0
NUMBNESS: 0
FATIGUE: 0
BLOOD IN STOOL: 0
DECREASED CONCENTRATION: 0
DIARRHEA: 0
BRUISES/BLEEDS EASILY: 0
ADENOPATHY: 0
CONFUSION: 0
HALLUCINATIONS: 0
RHINORRHEA: 0
PHOTOPHOBIA: 0
FEVER: 0

## 2025-02-06 NOTE — PROGRESS NOTES
HIV Clinic Visit:   Cuong Peña is a 44 y.o. male was last seen in [place] on today.    Missed antiretroviral doses in last 72 hours? No    Sexually active? No Partner/s aware of diagnosis? No    Condom use? No Partner on PrEP? No    Tobacco use: No    SUBJECTIVE: Mr Peña has no complains. His mother states that he never misses any doses of medications.    Spent 30 minutes reviewing results, notes, examining the patient, explaining the results of blood work to the mother and     Review of Systems   Constitutional: Negative.  Negative for appetite change, chills, diaphoresis, fatigue and fever.   HENT: Negative.  Negative for congestion, dental problem, ear discharge, ear pain, hearing loss, mouth sores, nosebleeds, rhinorrhea, sinus pressure, sinus pain, sneezing, sore throat and tinnitus.    Eyes: Negative.  Negative for photophobia, pain, discharge, redness, itching and visual disturbance.   Respiratory:  Negative for cough, shortness of breath and wheezing.    Cardiovascular: Negative.  Negative for chest pain.   Gastrointestinal: Negative.  Negative for abdominal distention, abdominal pain, anal bleeding, blood in stool, constipation, diarrhea, nausea, rectal pain and vomiting.   Endocrine: Negative.    Genitourinary: Negative.  Negative for difficulty urinating, dysuria, enuresis, flank pain, frequency, genital sores, hematuria and urgency.   Musculoskeletal: Negative.  Negative for arthralgias, back pain, gait problem, joint swelling, myalgias, neck pain and neck stiffness.   Skin: Negative.  Negative for color change, pallor, rash and wound.   Allergic/Immunologic: Negative.    Neurological: Negative.  Negative for dizziness, tremors, seizures, syncope, speech difficulty, light-headedness, numbness and headaches.   Hematological: Negative.  Negative for adenopathy. Does not bruise/bleed easily.   Psychiatric/Behavioral: Negative.  Negative for behavioral problems, confusion, decreased concentration,  dysphoric mood, hallucinations, self-injury and sleep disturbance. The patient is not nervous/anxious and is not hyperactive.          Objective   Visit Vitals  Smoking Status Never        Physical Exam  Constitutional:       Appearance: Normal appearance.   HENT:      Head: Normocephalic and atraumatic.      Right Ear: Tympanic membrane normal.      Left Ear: Tympanic membrane normal.      Nose: Nose normal.      Mouth/Throat:      Mouth: Mucous membranes are moist.      Tongue: No lesions.      Palate: No mass.      Pharynx: Oropharynx is clear. No pharyngeal swelling, oropharyngeal exudate, posterior oropharyngeal erythema or uvula swelling.      Tonsils: No tonsillar exudate.   Eyes:      General: Lids are normal.      Extraocular Movements: Extraocular movements intact.      Conjunctiva/sclera: Conjunctivae normal.      Pupils: Pupils are equal, round, and reactive to light.   Neck:      Thyroid: No thyroid mass or thyroid tenderness.      Vascular: Normal carotid pulses. No carotid bruit or JVD.      Trachea: Trachea normal.   Cardiovascular:      Rate and Rhythm: Normal rate and regular rhythm.      Pulses: Normal pulses.      Heart sounds: Normal heart sounds.   Pulmonary:      Effort: Pulmonary effort is normal.      Breath sounds: Normal breath sounds.   Abdominal:      General: Abdomen is flat. Bowel sounds are normal.      Palpations: Abdomen is soft. There is no hepatomegaly, splenomegaly or mass.      Tenderness: There is no abdominal tenderness.   Musculoskeletal:         General: No swelling, tenderness, deformity or signs of injury. Normal range of motion.      Cervical back: Full passive range of motion without pain, normal range of motion and neck supple.      Right lower leg: No edema.      Left lower leg: No edema.   Lymphadenopathy:      Cervical: No cervical adenopathy.   Skin:     General: Skin is warm and dry.   Neurological:      General: No focal deficit present.      Mental Status: He is  alert and oriented to person, place, and time.   Psychiatric:         Mood and Affect: Mood normal.         CURRENT MEDICATIONS    Current Outpatient Medications:     aminocaproic acid (AMICAR ORAL), Take 25 mg by mouth if needed., Disp: , Rfl:     Biktarvy -25 mg tablet, TAKE 1 TABLET BY MOUTH ONCE DAILY., Disp: 30 tablet, Rfl: 5    celecoxib (CeleBREX) 100 mg capsule, Take 1 capsule (100 mg) by mouth 2 times a day., Disp: 180 capsule, Rfl: 3    cholecalciferol (Vitamin D-3) 50 mcg (2,000 unit) capsule, TAKE 2 CAPSULES BY MOUTH ONCE DAILY, Disp: 60 capsule, Rfl: 5    emicizumab-kxwh (Hemlibra) 150 mg/mL solution, Inject 234 mg under the skin every 7 days. Disp 4 doses.  Include ancillary supplies.  11 refills, Disp: 8 mL, Rfl: 11    levETIRAcetam (Keppra) 1,000 mg tablet, TAKE ONE (1) TABLET BY MOUTH TWICE DAILY, Disp: 60 tablet, Rfl: 10    antihemophilic factor rcmb PEG (Adynovate) 250 (+/-) unit solution, Infuse 7,800 Units into a venous catheter if needed (As needed when directed by [facility]. Call [facility] for dosing guidlines). On Mondays and Thursdays, Disp: 2 each, Rfl: 6    antihemophilic RF VIII (Altuviiio) 4000 (+/-) unit recon soln, Infuse 7,750 Units into a venous catheter 1 time. Prior to surgery, Disp: , Rfl:     neomycin-polymyxin-HC (Cortisporin) otic solution, 3 drops into both ears three times daily for 5 days, Disp: 10 mL, Rfl: 0       Relevant Results  Labs  Lab Results   Component Value Date    ORN1PTWPW NOT DETECTED 09/29/2023    RR3CGW8ALBC 0.340 06/26/2024    VITD25 64 03/30/2023      Lab Results   Component Value Date    WBC 3.4 (L) 06/26/2024    HGB 14.8 06/26/2024    HCT 41.8 06/26/2024    MCV 89 06/26/2024     (L) 06/26/2024      Lab Results   Component Value Date    GLUCOSE 105 (H) 06/26/2024    CALCIUM 8.9 06/26/2024     06/26/2024    K 4.0 06/26/2024    CO2 26 06/26/2024     06/26/2024    BUN 8 06/26/2024    CREATININE 1.02 06/26/2024      Lab Results   Component Value  "Date    CHOL 160 09/29/2023    CHOL 159 10/27/2022    CHOL 165 06/08/2022     Lab Results   Component Value Date    HDL 48.8 09/29/2023    HDL 46.0 10/27/2022    HDL 46.3 06/08/2022     No results found for: \"LDLCALC\"  Lab Results   Component Value Date    TRIG 53 09/29/2023    TRIG 70 10/27/2022    TRIG 48 06/08/2022     No components found for: \"CHOLHDL\"       Assessment/Plan   Problem List Items Addressed This Visit       AIDS (Multi) - Primary    Current Assessment & Plan     HIV has remained under good control. VL undetectable. CD4 count appears to continue to rise.         Cirrhosis (Multi)    Current Assessment & Plan     History of treated HCV. Being followed by Liver with serial US, AFP.         Hemophilia A (Multi)    Current Assessment & Plan     No bleeding episodes. Has follow up with hematology.         Seizure (Multi)    Current Assessment & Plan     On Keppra. No seizure activity. We will recheck Keppra level in the next blood draw.               Michael Larson MD  "

## 2025-02-06 NOTE — LETTER
02/06/25    Sharon Villalpando [place]  [address] [address] [facility] [address]      Dear Dr. Sharon Villalpando MD,    I am writing to confirm that your patient, Cuong Peña, received care in my office on 02/06/25. I have enclosed a summary of the care provided to Cuong for your reference.    Please contact me with any questions you may have regarding the visit.    Sincerely,         Michael Larson MD  [address]  [place] [address] [phone]    CC: No Recipients

## 2025-02-06 NOTE — PROGRESS NOTES
FATUMA met with pt and his mom. She asked about the ADAP program, SW discussed that pt has Medicaid and does not necessarily need ADAP but it can be re-applied for as a back up if anything were to happen to his Medicaid. FATUMA let pt's mom know what is needed for for josé luis, copies made. FATUMA completed and submitted [employer] application and will all when approved.   Nelly Jeffers, LESA LARA

## 2025-05-06 ENCOUNTER — PHARMACY VISIT (OUTPATIENT)
Dept: PHARMACY | Facility: CLINIC | Age: 45
End: 2025-05-06
Payer: COMMERCIAL

## 2025-05-06 ENCOUNTER — OFFICE VISIT (OUTPATIENT)
Dept: PRIMARY CARE | Facility: HOSPITAL | Age: 45
End: 2025-05-06
Payer: COMMERCIAL

## 2025-05-06 VITALS
TEMPERATURE: 96.4 F | DIASTOLIC BLOOD PRESSURE: 88 MMHG | SYSTOLIC BLOOD PRESSURE: 125 MMHG | HEART RATE: 79 BPM | BODY MASS INDEX: 39.17 KG/M2 | WEIGHT: 315 LBS | HEIGHT: 75 IN | OXYGEN SATURATION: 95 %

## 2025-05-06 DIAGNOSIS — J34.89 RHINORRHEA: Primary | ICD-10-CM

## 2025-05-06 PROCEDURE — RXMED WILLOW AMBULATORY MEDICATION CHARGE

## 2025-05-06 PROCEDURE — 1036F TOBACCO NON-USER: CPT

## 2025-05-06 PROCEDURE — 99215 OFFICE O/P EST HI 40 MIN: CPT | Mod: GC

## 2025-05-06 PROCEDURE — 3008F BODY MASS INDEX DOCD: CPT

## 2025-05-06 PROCEDURE — 99215 OFFICE O/P EST HI 40 MIN: CPT

## 2025-05-06 RX ORDER — LORATADINE 10 MG/1
10 TABLET ORAL DAILY PRN
Qty: 30 TABLET | Refills: 11 | Status: SHIPPED | OUTPATIENT
Start: 2025-05-06 | End: 2026-05-06

## 2025-05-06 ASSESSMENT — PAIN SCALES - GENERAL: PAINLEVEL_OUTOF10: 0-NO PAIN

## 2025-05-06 ASSESSMENT — ENCOUNTER SYMPTOMS
LOSS OF SENSATION IN FEET: 0
DEPRESSION: 0
OCCASIONAL FEELINGS OF UNSTEADINESS: 0

## 2025-05-06 NOTE — PATIENT INSTRUCTIONS
Thank you for coming in today!    We added loratadine (Claritin) 10 mg daily to assist with his chronic cough and runny nose.  Please continue providing a well balanced diet for Cuong.     Follow up in 6 months or as needed for acute illness.      Please call office if you have any questions or concerns: [phone]  [facility] Fax: [phone]

## 2025-06-02 ENCOUNTER — PHARMACY VISIT (OUTPATIENT)
Dept: PHARMACY | Facility: CLINIC | Age: 45
End: 2025-06-02
Payer: COMMERCIAL

## 2025-06-02 PROCEDURE — RXMED WILLOW AMBULATORY MEDICATION CHARGE

## 2025-06-18 ENCOUNTER — LAB (OUTPATIENT)
Dept: LAB | Facility: HOSPITAL | Age: 45
End: 2025-06-18
Payer: MEDICAID

## 2025-06-18 ENCOUNTER — DOCUMENTATION (OUTPATIENT)
Dept: PEDIATRIC HEMATOLOGY/ONCOLOGY | Facility: HOSPITAL | Age: 45
End: 2025-06-18
Payer: COMMERCIAL

## 2025-06-18 DIAGNOSIS — D72.819 LEUKOPENIA, UNSPECIFIED TYPE: ICD-10-CM

## 2025-06-18 DIAGNOSIS — D50.0 IRON DEFICIENCY ANEMIA DUE TO CHRONIC BLOOD LOSS: ICD-10-CM

## 2025-06-18 DIAGNOSIS — D66 HEMOPHILIA A (MULTI): ICD-10-CM

## 2025-06-18 LAB
ALBUMIN SERPL BCP-MCNC: 4 G/DL (ref 3.4–5)
ALP SERPL-CCNC: 98 U/L (ref 33–120)
ALT SERPL W P-5'-P-CCNC: 22 U/L (ref 10–52)
ANION GAP SERPL CALC-SCNC: 11 MMOL/L (ref 10–20)
APTT PPP: 23 SECONDS (ref 26–36)
AST SERPL W P-5'-P-CCNC: 22 U/L (ref 9–39)
BASOPHILS # BLD AUTO: 0.02 X10*3/UL (ref 0–0.1)
BASOPHILS NFR BLD AUTO: 0.5 %
BILIRUB SERPL-MCNC: 1.1 MG/DL (ref 0–1.2)
BUN SERPL-MCNC: 9 MG/DL (ref 6–23)
CALCIUM SERPL-MCNC: 9.2 MG/DL (ref 8.6–10.3)
CHLORIDE SERPL-SCNC: 105 MMOL/L (ref 98–107)
CO2 SERPL-SCNC: 26 MMOL/L (ref 21–32)
CREAT SERPL-MCNC: 0.98 MG/DL (ref 0.5–1.3)
EGFRCR SERPLBLD CKD-EPI 2021: >90 ML/MIN/1.73M*2
EOSINOPHIL # BLD AUTO: 0.09 X10*3/UL (ref 0–0.7)
EOSINOPHIL NFR BLD AUTO: 2.4 %
ERYTHROCYTE [DISTWIDTH] IN BLOOD BY AUTOMATED COUNT: 12.1 % (ref 11.5–14.5)
FERRITIN SERPL-MCNC: 89 NG/ML (ref 20–300)
GLUCOSE SERPL-MCNC: 90 MG/DL (ref 74–99)
HCT VFR BLD AUTO: 44 % (ref 41–52)
HGB BLD-MCNC: 15.6 G/DL (ref 13.5–17.5)
IGA SERPL-MCNC: 268 MG/DL (ref 70–400)
IGG SERPL-MCNC: 1950 MG/DL (ref 700–1600)
IGM SERPL-MCNC: 81 MG/DL (ref 40–230)
IMM GRANULOCYTES # BLD AUTO: 0.01 X10*3/UL (ref 0–0.7)
IMM GRANULOCYTES NFR BLD AUTO: 0.3 % (ref 0–0.9)
INR PPP: 1.2 (ref 0.9–1.1)
IRON SATN MFR SERPL: 47 % (ref 25–45)
IRON SERPL-MCNC: 124 UG/DL (ref 35–150)
LYMPHOCYTES # BLD AUTO: 1.22 X10*3/UL (ref 1.2–4.8)
LYMPHOCYTES NFR BLD AUTO: 32.4 %
MCH RBC QN AUTO: 31.8 PG (ref 26–34)
MCHC RBC AUTO-ENTMCNC: 35.5 G/DL (ref 32–36)
MCV RBC AUTO: 90 FL (ref 80–100)
MONOCYTES # BLD AUTO: 0.77 X10*3/UL (ref 0.1–1)
MONOCYTES NFR BLD AUTO: 20.5 %
NEUTROPHILS # BLD AUTO: 1.65 X10*3/UL (ref 1.2–7.7)
NEUTROPHILS NFR BLD AUTO: 43.9 %
NRBC BLD-RTO: 0 /100 WBCS (ref 0–0)
PLATELET # BLD AUTO: 148 X10*3/UL (ref 150–450)
POTASSIUM SERPL-SCNC: 4 MMOL/L (ref 3.5–5.3)
PROT SERPL-MCNC: 7.5 G/DL (ref 6.4–8.2)
PROT SERPL-MCNC: 7.6 G/DL (ref 6.4–8.2)
PROTHROMBIN TIME: 12.9 SECONDS (ref 9.8–12.4)
RBC # BLD AUTO: 4.91 X10*6/UL (ref 4.5–5.9)
SODIUM SERPL-SCNC: 138 MMOL/L (ref 136–145)
TIBC SERPL-MCNC: 266 UG/DL (ref 240–445)
UIBC SERPL-MCNC: 142 UG/DL (ref 110–370)
WBC # BLD AUTO: 3.8 X10*3/UL (ref 4.4–11.3)

## 2025-06-18 PROCEDURE — 84165 PROTEIN E-PHORESIS SERUM: CPT | Performed by: PATHOLOGY

## 2025-06-18 PROCEDURE — 84165 PROTEIN E-PHORESIS SERUM: CPT

## 2025-06-18 PROCEDURE — 85025 COMPLETE CBC W/AUTO DIFF WBC: CPT

## 2025-06-18 PROCEDURE — 83521 IG LIGHT CHAINS FREE EACH: CPT

## 2025-06-18 PROCEDURE — 36415 COLL VENOUS BLD VENIPUNCTURE: CPT

## 2025-06-18 PROCEDURE — 82728 ASSAY OF FERRITIN: CPT

## 2025-06-18 PROCEDURE — 84155 ASSAY OF PROTEIN SERUM: CPT | Mod: 59

## 2025-06-18 PROCEDURE — 85730 THROMBOPLASTIN TIME PARTIAL: CPT

## 2025-06-18 PROCEDURE — 82784 ASSAY IGA/IGD/IGG/IGM EACH: CPT

## 2025-06-18 PROCEDURE — 80053 COMPREHEN METABOLIC PANEL: CPT

## 2025-06-18 PROCEDURE — 85610 PROTHROMBIN TIME: CPT

## 2025-06-18 PROCEDURE — 83540 ASSAY OF IRON: CPT

## 2025-06-18 NOTE — PROGRESS NOTES
[facility] PT Consult    Patient: Cuong Peña  MRN: [id number]  06/18/25    Assessment   Cuong is a 44 y.o. PMHx Hemophilia A who was seen by Physical Therapy in clinic 6/18/2025. Pts mom states Cuong is doing OK. States that he continues to go to [facility] Mon-Fri. States that Cuong has not had any bleeds in the past year, no need for factor. He is on Hemlibra. His L knee continues to do well and is not bothering him today.    Upon assessment, pt demonstrates limited L forearm supination and limited L elbow extension. Demonstrates min-mod ROM limitations grossly throughout L ankle. Mild crepitus noted at L knee. No swelling, warmth, or tenderness noted at any joints. Gait pattern WFL.    Plan/Recommendations:  Supportive knee brace issued to wear for L knee PRN  Physical Therapy to follow during clinic visits.      Subjective   Doing OK. Continues to go to [facility] Mon-Fri.    Past Medical History: [Medical History]    [Medical History]  Past Medical History  Diagnosis Date    Cyst of kidney, acquired     Kidney cysts    Encounter for immunization 10/09/2020    Need for vaccination    Flat foot (pes planus) (acquired), unspecified foot     Flat foot, acquired    Pain in unspecified ankle and joints of unspecified foot 10/07/2013    Ankle joint pain    Personal history of other diseases of the digestive system     Personal history of cirrhosis    Personal history of other diseases of the nervous system and sense organs     Personal history of otitis media    Personal history of other drug therapy 10/21/2016    History of influenza vaccination    Personal history of other endocrine, nutritional and metabolic disease 05/02/2019    History of morbid obesity    Personal history of other endocrine, nutritional and metabolic disease     History of obesity    Personal history of other infectious and parasitic diseases 10/07/2021    History of hepatitis C virus infection     Unspecified open wound, left foot, initial encounter     Open wound of left foot       Past Surgical History: [Surgical History]    [Surgical History]  Past Surgical History  Procedure Laterality Date    ANKLE SURGERY  01/29/2014    Ankle Surgery    TONSILLECTOMY  10/07/2013    Tonsillectomy       Prophylaxis: Yes  Hemlibra    Interim Bleeding History:  Recent Joint Bleeds: none reported    Recent Muscle Bleeds: none reported    Current Target Joints:   Has the patient had four or more bleeds in a single joint in the last six months?  No    Chronic arthropathy: L elbow, L ankle    Pain: [place] reports no pain    Additional Review  Lives with [facility] [place]  School/Vocational: [employer] (Mon-Fri)    Objective   Joint Assessment:  *WFL indicates no swelling, warmth, crepitus, or tenderness to palpation noted  Left Elbow: WFL  Right Elbow: WFL  Left Knee: mild crepitus noted. No swelling warmth or TTP noted  Right Knee: WFL  Left Ankle: WFL  Right Ankle: WFL    Range of Motion:   Forearm Supination: Left: mod limitation and Right WNL  Forearm Pronation: Left: WFL and Right WNL  Elbow Extention (0): Left mod limitation and Right WNL  Elbow Flexion (0-145): Left WFL and Right WNL  Knee Flexion (0-135): Left WNL and Right WNL  Knee Extension (0): Left WNL and Right WNL  Ankle Plantarflexion (0-50): Left min-mod limitation and Right WNL  Ankle Dorsiflexion: Left min-mod limitation and Right WNL  Ankle Inversion (0-30): Left min-mod limitation and Right WNL  Ankle Eversion (0-20): Left min-mod limitation and Right WNL    Mobility:  Gait: WFL  Sit <> Stand: WFL    Felisha Junior, PT

## 2025-06-19 LAB
KAPPA LC SERPL-MCNC: 4.35 MG/DL (ref 0.33–1.94)
KAPPA LC/LAMBDA SER: 1.78 {RATIO} (ref 0.26–1.65)
LAMBDA LC SERPL-MCNC: 2.45 MG/DL (ref 0.57–2.63)

## 2025-06-20 LAB
ALBUMIN: 3.8 G/DL (ref 3.4–5)
ALPHA 1 GLOBULIN: 0.3 G/DL (ref 0.2–0.6)
ALPHA 2 GLOBULIN: 0.7 G/DL (ref 0.4–1.1)
BETA GLOBULIN: 0.8 G/DL (ref 0.5–1.2)
GAMMA GLOBULIN: 1.9 G/DL (ref 0.5–1.4)
PATH REVIEW-SERUM PROTEIN ELECTROPHORESIS: ABNORMAL
PROTEIN ELECTROPHORESIS COMMENT: ABNORMAL

## 2025-06-30 PROCEDURE — RXMED WILLOW AMBULATORY MEDICATION CHARGE

## 2025-07-01 ENCOUNTER — PHARMACY VISIT (OUTPATIENT)
Dept: PHARMACY | Facility: CLINIC | Age: 45
End: 2025-07-01
Payer: COMMERCIAL

## 2025-07-01 NOTE — PROGRESS NOTES
TSH:   Lab Results   Component Value Date    TSH 1.91 09/28/2021      Chest:     No chief complaint on file.    HPI:  Cuong Peña is a 44 y.o. male following up with me today. Last seen 9/2024. He is s/p excision of chin cyst and cerumen removal on 8/27/24. Path returned Epidermal inclusion cyst.  PMHx of epilepsy, intellectual disability, hemophilia A, HIV infection, liver cirrhosis due to chronic hepatitis C     Social History  He reports that he has never smoked. He has never used smokeless tobacco. He reports that he does not drink alcohol and does not use drugs.    History:  Dx: ***    ROS:  Review of Systems     PE:  ENT Physical Exam     Procedures     ASSESSMENT AND PLAN:  Problem List Items Addressed This Visit    None         By signing my name below, I, Anne Bravoibjaylene, attest that this documentation has been prepared under the direction and in the presence of Dr. Sayra Rosales MD.     All medical record entries made by the Scribe were at my direction and personally dictated by me, Dr. Sayra Rosales. I have reviewed the chart and agree that the record accurately reflects my personal performance of the history, physical exam, discussion and plan.

## 2025-07-08 NOTE — PROGRESS NOTES
TSH:   Lab Results   Component Value Date    TSH 1.91 09/28/2021      Chest:     No chief complaint on file.    HPI:  Cuong Peña is a 44 y.o. male following up with me today. Last seen 9/2024. He is s/p excision of chin cyst and cerumen removal on 8/27/24. Path returned Epidermal inclusion cyst.  PMHx of epilepsy, intellectual disability, hemophilia A, HIV infection, liver cirrhosis due to chronic hepatitis C     Social History  He reports that he has never smoked. He has never used smokeless tobacco. He reports that he does not drink alcohol and does not use drugs.    History:  Dx: ***    ROS:  Review of Systems     PE:  ENT Physical Exam     Procedures     ASSESSMENT AND PLAN:  Problem List Items Addressed This Visit    None         By signing my name below, I, Anne Bravoibjaylene, attest that this documentation has been prepared under the direction and in the presence of Dr. Sayra Rosales MD.     All medical record entries made by the Scribe were at my direction and personally dictated by me, Dr. Sayra Rosales. I have reviewed the chart and agree that the record accurately reflects my personal performance of the history, physical exam, discussion and plan.           Epidermal inclusion cyst - Primary    Current Assessment & Plan   S/p excision  Scar is well healed  Reassurance provided.   No further follow up needed           Sayra Rosales MD    Head & Neck Surgical Oncology & Reconstruction  Department of Otolaryngology - Head and Neck Surgery     By signing my name below, I, Royce Bravo, attest that this documentation has been prepared under the direction and in the presence of Dr. Sayra Rosales MD.     All medical record entries made by the Scribe were at my direction and personally dictated by me, Dr. Sayra Rosales. I have reviewed the chart and agree that the record accurately reflects my personal performance of the history, physical exam, discussion and plan.

## 2025-07-09 ENCOUNTER — LAB (OUTPATIENT)
Dept: LAB | Facility: HOSPITAL | Age: 45
End: 2025-07-09
Payer: MEDICARE

## 2025-07-09 ENCOUNTER — OFFICE VISIT (OUTPATIENT)
Dept: OTOLARYNGOLOGY | Facility: HOSPITAL | Age: 45
End: 2025-07-09
Payer: MEDICARE

## 2025-07-09 ENCOUNTER — APPOINTMENT (OUTPATIENT)
Dept: OTOLARYNGOLOGY | Facility: HOSPITAL | Age: 45
End: 2025-07-09
Payer: COMMERCIAL

## 2025-07-09 VITALS — BODY MASS INDEX: 39.17 KG/M2 | TEMPERATURE: 98.2 F | WEIGHT: 315 LBS | HEIGHT: 75 IN

## 2025-07-09 DIAGNOSIS — L72.0 EPIDERMAL INCLUSION CYST: Primary | ICD-10-CM

## 2025-07-09 LAB
25(OH)D3 SERPL-MCNC: 49 NG/ML (ref 30–100)
ALBUMIN SERPL BCP-MCNC: 3.8 G/DL (ref 3.4–5)
ALP SERPL-CCNC: 95 U/L (ref 33–120)
ALT SERPL W P-5'-P-CCNC: 25 U/L (ref 10–52)
ANION GAP SERPL CALC-SCNC: 10 MMOL/L (ref 10–20)
AST SERPL W P-5'-P-CCNC: 21 U/L (ref 9–39)
BASOPHILS # BLD AUTO: 0.03 X10*3/UL (ref 0–0.1)
BASOPHILS NFR BLD AUTO: 0.7 %
BILIRUB SERPL-MCNC: 0.8 MG/DL (ref 0–1.2)
BUN SERPL-MCNC: 9 MG/DL (ref 6–23)
CALCIUM SERPL-MCNC: 9.2 MG/DL (ref 8.6–10.3)
CHLORIDE SERPL-SCNC: 105 MMOL/L (ref 98–107)
CHOLEST SERPL-MCNC: 161 MG/DL (ref 0–199)
CHOLESTEROL/HDL RATIO: 3.3
CO2 SERPL-SCNC: 28 MMOL/L (ref 21–32)
CREAT SERPL-MCNC: 1.08 MG/DL (ref 0.5–1.3)
EGFRCR SERPLBLD CKD-EPI 2021: 86 ML/MIN/1.73M*2
EOSINOPHIL # BLD AUTO: 0.09 X10*3/UL (ref 0–0.7)
EOSINOPHIL NFR BLD AUTO: 2.2 %
ERYTHROCYTE [DISTWIDTH] IN BLOOD BY AUTOMATED COUNT: 12.1 % (ref 11.5–14.5)
GLUCOSE SERPL-MCNC: 90 MG/DL (ref 74–99)
HCT VFR BLD AUTO: 42.8 % (ref 41–52)
HDLC SERPL-MCNC: 48.2 MG/DL
HGB BLD-MCNC: 15.4 G/DL (ref 13.5–17.5)
IMM GRANULOCYTES # BLD AUTO: 0.01 X10*3/UL (ref 0–0.7)
IMM GRANULOCYTES NFR BLD AUTO: 0.2 % (ref 0–0.9)
LDLC SERPL CALC-MCNC: 102 MG/DL
LEVETIRACETAM SERPL-MCNC: 21 UG/ML (ref 10–40)
LYMPHOCYTES # BLD AUTO: 1.51 X10*3/UL (ref 1.2–4.8)
LYMPHOCYTES NFR BLD AUTO: 36.4 %
MCH RBC QN AUTO: 32.2 PG (ref 26–34)
MCHC RBC AUTO-ENTMCNC: 36 G/DL (ref 32–36)
MCV RBC AUTO: 89 FL (ref 80–100)
MONOCYTES # BLD AUTO: 0.78 X10*3/UL (ref 0.1–1)
MONOCYTES NFR BLD AUTO: 18.8 %
NEUTROPHILS # BLD AUTO: 1.73 X10*3/UL (ref 1.2–7.7)
NEUTROPHILS NFR BLD AUTO: 41.7 %
NON HDL CHOLESTEROL: 113 MG/DL (ref 0–149)
NRBC BLD-RTO: 0 /100 WBCS (ref 0–0)
PLATELET # BLD AUTO: 134 X10*3/UL (ref 150–450)
POTASSIUM SERPL-SCNC: 4 MMOL/L (ref 3.5–5.3)
PROT SERPL-MCNC: 7.3 G/DL (ref 6.4–8.2)
RBC # BLD AUTO: 4.79 X10*6/UL (ref 4.5–5.9)
SODIUM SERPL-SCNC: 139 MMOL/L (ref 136–145)
TRIGL SERPL-MCNC: 55 MG/DL (ref 0–149)
VLDL: 11 MG/DL (ref 0–40)
WBC # BLD AUTO: 4.2 X10*3/UL (ref 4.4–11.3)

## 2025-07-09 PROCEDURE — 80177 DRUG SCRN QUAN LEVETIRACETAM: CPT | Performed by: INTERNAL MEDICINE

## 2025-07-09 PROCEDURE — 80053 COMPREHEN METABOLIC PANEL: CPT | Performed by: INTERNAL MEDICINE

## 2025-07-09 PROCEDURE — 99213 OFFICE O/P EST LOW 20 MIN: CPT | Performed by: OTOLARYNGOLOGY

## 2025-07-09 PROCEDURE — 80061 LIPID PANEL: CPT | Performed by: INTERNAL MEDICINE

## 2025-07-09 PROCEDURE — 85025 COMPLETE CBC W/AUTO DIFF WBC: CPT | Performed by: INTERNAL MEDICINE

## 2025-07-09 PROCEDURE — 87536 HIV-1 QUANT&REVRSE TRNSCRPJ: CPT | Performed by: INTERNAL MEDICINE

## 2025-07-09 PROCEDURE — 88185 FLOWCYTOMETRY/TC ADD-ON: CPT | Performed by: INTERNAL MEDICINE

## 2025-07-09 PROCEDURE — 82306 VITAMIN D 25 HYDROXY: CPT | Performed by: INTERNAL MEDICINE

## 2025-07-09 PROCEDURE — 3008F BODY MASS INDEX DOCD: CPT | Performed by: OTOLARYNGOLOGY

## 2025-07-09 ASSESSMENT — PAIN SCALES - GENERAL: PAINLEVEL_OUTOF10: 0-NO PAIN

## 2025-07-10 LAB
CD3+CD4+ CELLS # BLD: 0.45 X10E9/L (ref 0.35–2.74)
CD3+CD4+ CELLS # BLD: 453 /MM3 (ref 350–2740)
CD3+CD4+ CELLS NFR BLD: 30 % (ref 29–57)
CD3+CD4+ CELLS/CD3+CD8+ CLL BLD: 1.11 % (ref 1–3.5)
CD3+CD8+ CELLS # BLD: 0.41 X10E9/L (ref 0.08–1.49)
CD3+CD8+ CELLS NFR BLD: 27 % (ref 7–31)
HIV1 RNA # PLAS NAA DL=20: NOT DETECTED {COPIES}/ML
HIV1 RNA SPEC NAA+PROBE-LOG#: NORMAL {LOG_COPIES}/ML
HOLD SPECIMEN: NORMAL
LYMPHOCYTES # SPEC AUTO: 1.51 X10*3/UL

## 2025-07-18 ASSESSMENT — ENCOUNTER SYMPTOMS
ADENOPATHY: 0
CHILLS: 0
TROUBLE SWALLOWING: 0
VOMITING: 0
FEVER: 0
FATIGUE: 0
NAUSEA: 0
COUGH: 0
VOICE CHANGE: 0
NECK PAIN: 0
FACIAL SWELLING: 0
SORE THROAT: 0
STRIDOR: 0
SHORTNESS OF BREATH: 0
APPETITE CHANGE: 0
UNEXPECTED WEIGHT CHANGE: 0

## 2025-07-21 DIAGNOSIS — D66 HEMOPHILIA A (MULTI): Primary | ICD-10-CM

## 2025-07-21 NOTE — TELEPHONE ENCOUNTER
[facility] Nurse note: [facility] requesting refill on Hemlibra.  Cuong last seen in clinic 6/18/25. Next visit scheduled for Feb 2026.  Weight is 162kg.

## 2025-07-22 RX ORDER — EMICIZUMAB 150 MG/ML
1.5 INJECTION, SOLUTION SUBCUTANEOUS
Qty: 8 ML | Refills: 11 | Status: SHIPPED | OUTPATIENT
Start: 2025-07-22

## 2025-07-25 DIAGNOSIS — Z21 HIV INFECTION, UNSPECIFIED SYMPTOM STATUS: ICD-10-CM

## 2025-07-28 RX ORDER — BICTEGRAVIR SODIUM, EMTRICITABINE, AND TENOFOVIR ALAFENAMIDE FUMARATE 50; 200; 25 MG/1; MG/1; MG/1
1 TABLET ORAL DAILY
Qty: 30 TABLET | Refills: 5 | Status: SHIPPED | OUTPATIENT
Start: 2025-07-28 | End: 2025-07-31 | Stop reason: SDUPTHER

## 2025-07-31 ENCOUNTER — PHARMACY VISIT (OUTPATIENT)
Dept: PHARMACY | Facility: CLINIC | Age: 45
End: 2025-07-31
Payer: COMMERCIAL

## 2025-07-31 ENCOUNTER — OFFICE VISIT (OUTPATIENT)
Dept: IMMUNOLOGY | Facility: CLINIC | Age: 45
End: 2025-07-31
Payer: COMMERCIAL

## 2025-07-31 VITALS
DIASTOLIC BLOOD PRESSURE: 84 MMHG | HEART RATE: 82 BPM | BODY MASS INDEX: 45.2 KG/M2 | SYSTOLIC BLOOD PRESSURE: 122 MMHG | OXYGEN SATURATION: 96 % | WEIGHT: 315 LBS

## 2025-07-31 DIAGNOSIS — B20 AIDS (MULTI): Primary | ICD-10-CM

## 2025-07-31 DIAGNOSIS — E55.9 VITAMIN D DEFICIENCY: ICD-10-CM

## 2025-07-31 DIAGNOSIS — K74.69 OTHER CIRRHOSIS OF LIVER: ICD-10-CM

## 2025-07-31 DIAGNOSIS — E66.01 MORBID OBESITY (MULTI): ICD-10-CM

## 2025-07-31 DIAGNOSIS — Z11.3 ROUTINE SCREENING FOR STI (SEXUALLY TRANSMITTED INFECTION): ICD-10-CM

## 2025-07-31 DIAGNOSIS — J34.89 RHINORRHEA: ICD-10-CM

## 2025-07-31 DIAGNOSIS — Z21 HIV INFECTION, UNSPECIFIED SYMPTOM STATUS: ICD-10-CM

## 2025-07-31 DIAGNOSIS — R56.9 SEIZURE (MULTI): ICD-10-CM

## 2025-07-31 DIAGNOSIS — Z79.899 HIGH RISK MEDICATION USE: ICD-10-CM

## 2025-07-31 PROCEDURE — 99214 OFFICE O/P EST MOD 30 MIN: CPT | Performed by: INTERNAL MEDICINE

## 2025-07-31 PROCEDURE — RXMED WILLOW AMBULATORY MEDICATION CHARGE

## 2025-07-31 PROCEDURE — 1036F TOBACCO NON-USER: CPT | Performed by: INTERNAL MEDICINE

## 2025-07-31 RX ORDER — LORATADINE 10 MG/1
10 TABLET ORAL DAILY PRN
Qty: 30 TABLET | Refills: 11 | Status: SHIPPED | OUTPATIENT
Start: 2025-07-31 | End: 2026-07-31

## 2025-07-31 RX ORDER — ACETAMINOPHEN 500 MG
50 TABLET ORAL DAILY
Qty: 60 CAPSULE | Refills: 6 | Status: SHIPPED | OUTPATIENT
Start: 2025-07-31

## 2025-07-31 RX ORDER — BICTEGRAVIR SODIUM, EMTRICITABINE, AND TENOFOVIR ALAFENAMIDE FUMARATE 50; 200; 25 MG/1; MG/1; MG/1
1 TABLET ORAL DAILY
Qty: 30 TABLET | Refills: 5 | Status: SHIPPED | OUTPATIENT
Start: 2025-07-31

## 2025-07-31 ASSESSMENT — ENCOUNTER SYMPTOMS
CARDIOVASCULAR NEGATIVE: 1
CHILLS: 0
COLOR CHANGE: 0
FEVER: 0
ABDOMINAL DISTENTION: 0
PSYCHIATRIC NEGATIVE: 1
BRUISES/BLEEDS EASILY: 0
BACK PAIN: 0
EYES NEGATIVE: 1
PHOTOPHOBIA: 0
DIFFICULTY URINATING: 0
WHEEZING: 0
EYE DISCHARGE: 0
APPETITE CHANGE: 0
NECK STIFFNESS: 0
VOMITING: 0
NEUROLOGICAL NEGATIVE: 1
LIGHT-HEADEDNESS: 0
HALLUCINATIONS: 0
HYPERACTIVE: 0
WOUND: 0
CONSTIPATION: 0
EYE REDNESS: 0
NERVOUS/ANXIOUS: 0
NAUSEA: 0
SHORTNESS OF BREATH: 0
HEMATURIA: 0
CONSTITUTIONAL NEGATIVE: 1
SLEEP DISTURBANCE: 0
MUSCULOSKELETAL NEGATIVE: 1
SORE THROAT: 0
COUGH: 0
DECREASED CONCENTRATION: 0
FREQUENCY: 0
EYE ITCHING: 0
HEMATOLOGIC/LYMPHATIC NEGATIVE: 1
FATIGUE: 0
MYALGIAS: 0
ENDOCRINE NEGATIVE: 1
ADENOPATHY: 0
DYSURIA: 0
NECK PAIN: 0
NUMBNESS: 0
EYE PAIN: 0
TREMORS: 0
ALLERGIC/IMMUNOLOGIC NEGATIVE: 1
SINUS PAIN: 0
SPEECH DIFFICULTY: 0
CONFUSION: 0
DYSPHORIC MOOD: 0
JOINT SWELLING: 0
ARTHRALGIAS: 0
ANAL BLEEDING: 0
HEADACHES: 0
SEIZURES: 0
DIZZINESS: 0
RHINORRHEA: 0
BLOOD IN STOOL: 0
SINUS PRESSURE: 0
DIARRHEA: 0
RECTAL PAIN: 0
FLANK PAIN: 0
ABDOMINAL PAIN: 0
DIAPHORESIS: 0
GASTROINTESTINAL NEGATIVE: 1

## 2025-07-31 ASSESSMENT — PAIN SCALES - GENERAL: PAINLEVEL_OUTOF10: 0-NO PAIN

## 2025-07-31 NOTE — ASSESSMENT & PLAN NOTE
The patient has ch ~ 40 lb. The mother states that she is concerned that the staff at the workshop does not control the patient's intake of snacks. Recommended that she speak to the staff.

## 2025-07-31 NOTE — LETTER
07/31/25    Sharon Villalpando MD  [address] [facility] [address]      Dear Dr. Sharon Villalpando MD,    I am writing to confirm that your patient, Cuong Peña, received care in my office on 07/31/25. I have enclosed a summary of the care provided to Cuong for your reference.    Please contact me with any questions you may have regarding the visit.    Sincerely,         Michael Larson MD  [address]  [facility] [place] [address] [phone]    CC: No Recipients

## 2025-07-31 NOTE — PROGRESS NOTES
HIV Clinic Visit:   Cuong Peña is a 44 y.o. male was last seen in [place] on today.    Missed antiretroviral doses in last 72 hours? No    Sexually active? No Partner/s aware of diagnosis? No    Condom use? No Partner on PrEP? No    Tobacco use: No    SUBJECTIVE: Mr Peña comes for a routine follow up visit. The patient's mother states that the patient does not have any complains. She is concerned about his weight gain.        Review of Systems   Constitutional: Negative.  Negative for appetite change, chills, diaphoresis, fatigue and fever.   HENT: Negative.  Negative for congestion, dental problem, ear discharge, ear pain, hearing loss, mouth sores, nosebleeds, rhinorrhea, sinus pressure, sinus pain, sneezing, sore throat and tinnitus.    Eyes: Negative.  Negative for photophobia, pain, discharge, redness, itching and visual disturbance.   Respiratory:  Negative for cough, shortness of breath and wheezing.    Cardiovascular: Negative.  Negative for chest pain.   Gastrointestinal: Negative.  Negative for abdominal distention, abdominal pain, anal bleeding, blood in stool, constipation, diarrhea, nausea, rectal pain and vomiting.   Endocrine: Negative.    Genitourinary: Negative.  Negative for difficulty urinating, dysuria, enuresis, flank pain, frequency, genital sores, hematuria and urgency.   Musculoskeletal: Negative.  Negative for arthralgias, back pain, gait problem, joint swelling, myalgias, neck pain and neck stiffness.   Skin: Negative.  Negative for color change, pallor, rash and wound.   Allergic/Immunologic: Negative.    Neurological: Negative.  Negative for dizziness, tremors, seizures, syncope, speech difficulty, light-headedness, numbness and headaches.   Hematological: Negative.  Negative for adenopathy. Does not bruise/bleed easily.   Psychiatric/Behavioral: Negative.  Negative for behavioral problems, confusion, decreased concentration, dysphoric mood, hallucinations, self-injury and sleep  disturbance. The patient is not nervous/anxious and is not hyperactive.          Objective   Visit Vitals  /84 (BP Location: Right arm, Patient Position: Sitting)   Pulse 82   Wt (!) 164 kg (361 lb 9.6 oz)   SpO2 96%   BMI 45.20 kg/m²   Smoking Status Never   BSA 2.95 m²        Physical Exam  Constitutional:       Appearance: Normal appearance. He is obese.   HENT:      Head: Normocephalic and atraumatic.      Right Ear: Tympanic membrane normal.      Left Ear: Tympanic membrane normal.      Nose: Nose normal.      Mouth/Throat:      Mouth: Mucous membranes are moist.      Tongue: No lesions.      Palate: No mass.      Pharynx: Oropharynx is clear. No pharyngeal swelling, oropharyngeal exudate, posterior oropharyngeal erythema or uvula swelling.      Tonsils: No tonsillar exudate.     Eyes:      General: Lids are normal.      Extraocular Movements: Extraocular movements intact.      Conjunctiva/sclera: Conjunctivae normal.      Pupils: Pupils are equal, round, and reactive to light.     Neck:      Thyroid: No thyroid mass or thyroid tenderness.      Vascular: Normal carotid pulses. No carotid bruit or JVD.      Trachea: Trachea normal.     Cardiovascular:      Rate and Rhythm: Normal rate and regular rhythm.      Pulses: Normal pulses.      Heart sounds: Normal heart sounds.   Pulmonary:      Effort: Pulmonary effort is normal.      Breath sounds: Normal breath sounds.   Abdominal:      General: Abdomen is flat. Bowel sounds are normal.      Palpations: Abdomen is soft. There is no hepatomegaly, splenomegaly or mass.      Tenderness: There is no abdominal tenderness.     Musculoskeletal:         General: No swelling, tenderness, deformity or signs of injury. Normal range of motion.      Cervical back: Full passive range of motion without pain, normal range of motion and neck supple.      Right lower leg: No edema.      Left lower leg: No edema.   Lymphadenopathy:      Cervical: No cervical adenopathy.  "    Skin:     General: Skin is warm and dry.     Neurological:      General: No focal deficit present.      Mental Status: He is alert and oriented to person, place, and time.     Psychiatric:         Mood and Affect: Mood normal.         CURRENT MEDICATIONS  Current Medications[1]       Relevant Results  Labs  Lab Results   Component Value Date    USF2IZAAZ NOT DETECTED 09/29/2023    MK2GFP8PWYG 0.408 07/09/2025    VITD25 49 07/09/2025      Lab Results   Component Value Date    WBC 4.2 (L) 07/09/2025    HGB 15.4 07/09/2025    HCT 42.8 07/09/2025    MCV 89 07/09/2025     (L) 07/09/2025      Lab Results   Component Value Date    GLUCOSE 90 07/09/2025    CALCIUM 9.2 07/09/2025     07/09/2025    K 4.0 07/09/2025    CO2 28 07/09/2025     07/09/2025    BUN 9 07/09/2025    CREATININE 1.08 07/09/2025      Lab Results   Component Value Date    CHOL 161 07/09/2025    CHOL 160 09/29/2023    CHOL 159 10/27/2022     Lab Results   Component Value Date    HDL 48.2 07/09/2025    HDL 48.8 09/29/2023    HDL 46.0 10/27/2022     Lab Results   Component Value Date    LDLCALC 102 (H) 07/09/2025     Lab Results   Component Value Date    TRIG 55 07/09/2025    TRIG 53 09/29/2023    TRIG 70 10/27/2022     No components found for: \"CHOLHDL\"       Assessment/Plan   Problem List Items Addressed This Visit       AIDS (Multi) - Primary    Cirrhosis (Multi)    Current Assessment & Plan   The patient has evidence of cirrhosis per US. He needs a repeat US. Recommended to the mother that he is seen by Dr Grant. We will also recheck his AFP.         Morbid obesity (Multi)    Current Assessment & Plan   The patient has ch ~ 40 lb. The mother states that she is concerned that the staff at the workshop does not control the patient's intake of snacks. Recommended that she speak to the staff.         Seizure (Multi)    Relevant Orders    Levetiracetam (Completed)    Vitamin D deficiency    Current Assessment & Plan   He continues " to be on Vit D supplementation. His 25 OH Vit D is normal.         Relevant Orders    Vitamin D 25-Hydroxy,Total (for eval of Vitamin D levels) (Completed)     Other Visit Diagnoses         HIV infection, unspecified symptom status        Relevant Orders    CBC and Auto Differential (Completed)    CD4/8 Panel (Completed)    Comprehensive Metabolic Panel (Completed)    HIV RNA, quantitative, PCR (Completed)    Extra Tubes (Completed)      High risk medication use        Relevant Orders    Lipid Panel (Completed)        The patient will come back in follow up in 6 months      Michael Larson MD       [1]   Current Outpatient Medications:     aminocaproic acid (AMICAR ORAL), Take 25 mg by mouth if needed., Disp: , Rfl:     antihemophilic factor VIII, recombinant,, Fc-VWF-XTEN fusion protein-ehtl (Antihemophilic RF VIII) 4,000 unit recon soln, Infuse 8,100 Units into a venous catheter see administration instructions. To treat or prevent bleeding. Once or as directed by [facility], Disp: 2 each, Rfl: 5    Biktarvy -25 mg tablet, Take 1 tablet by mouth once daily., Disp: 30 tablet, Rfl: 5    celecoxib (CeleBREX) 100 mg capsule, Take 1 capsule (100 mg) by mouth 2 times a day., Disp: 180 capsule, Rfl: 3    cholecalciferol (Vitamin D-3) 50 mcg (2,000 unit) capsule, Take 1 capsule (50 mcg) by mouth once daily., Disp: 60 capsule, Rfl: 6    emicizumab-kxwh (Hemlibra) 150 mg/mL solution, Inject 243 mg under the skin every 7 days. Note: dose increase to adjust for current weight., Disp: 8 mL, Rfl: 11    levETIRAcetam (Keppra) 1,000 mg tablet, TAKE ONE (1) TABLET BY MOUTH TWICE DAILY, Disp: 60 tablet, Rfl: 10    loratadine (Claritin) 10 mg tablet, Take 1 tablet (10 mg) by mouth once daily as needed for allergies., Disp: 30 tablet, Rfl: 11    neomycin-polymyxin-HC (Cortisporin) otic solution, 3 drops into both ears three times daily for 5 days (Patient not taking: Reported on 7/31/2025), Disp: 10 mL, Rfl: 0

## 2025-07-31 NOTE — ASSESSMENT & PLAN NOTE
The patient has evidence of cirrhosis per US. He needs a repeat US. Recommended to the mother that he is seen by Dr Grant. We will also recheck his AFP.   GYN

## 2025-08-01 ENCOUNTER — LAB (OUTPATIENT)
Dept: LAB | Facility: HOSPITAL | Age: 45
End: 2025-08-01
Payer: MEDICARE

## 2025-08-01 ENCOUNTER — OFFICE VISIT (OUTPATIENT)
Dept: CARDIOLOGY | Facility: HOSPITAL | Age: 45
End: 2025-08-01
Payer: MEDICARE

## 2025-08-01 VITALS
DIASTOLIC BLOOD PRESSURE: 62 MMHG | SYSTOLIC BLOOD PRESSURE: 98 MMHG | RESPIRATION RATE: 16 BRPM | HEART RATE: 93 BPM | WEIGHT: 315 LBS | BODY MASS INDEX: 40.43 KG/M2 | HEIGHT: 74 IN

## 2025-08-01 DIAGNOSIS — I87.393 CHRONIC VENOUS HYPERTENSION WITH COMPLICATION INVOLVING BOTH SIDES: Primary | ICD-10-CM

## 2025-08-01 DIAGNOSIS — Z11.3 ROUTINE SCREENING FOR STI (SEXUALLY TRANSMITTED INFECTION): ICD-10-CM

## 2025-08-01 DIAGNOSIS — Z11.3 ENCOUNTER FOR SCREENING FOR INFECTIONS WITH A PREDOMINANTLY SEXUAL MODE OF TRANSMISSION: Primary | ICD-10-CM

## 2025-08-01 LAB
AFP SERPL-MCNC: <4 NG/ML (ref 0–9)
TREPONEMA PALLIDUM IGG+IGM AB [PRESENCE] IN SERUM OR PLASMA BY IMMUNOASSAY: NONREACTIVE

## 2025-08-01 PROCEDURE — 99213 OFFICE O/P EST LOW 20 MIN: CPT

## 2025-08-01 PROCEDURE — 82105 ALPHA-FETOPROTEIN SERUM: CPT

## 2025-08-01 PROCEDURE — 36415 COLL VENOUS BLD VENIPUNCTURE: CPT

## 2025-08-01 PROCEDURE — G2211 COMPLEX E/M VISIT ADD ON: HCPCS | Performed by: INTERNAL MEDICINE

## 2025-08-01 PROCEDURE — 1036F TOBACCO NON-USER: CPT | Performed by: INTERNAL MEDICINE

## 2025-08-01 PROCEDURE — 99213 OFFICE O/P EST LOW 20 MIN: CPT | Performed by: INTERNAL MEDICINE

## 2025-08-01 PROCEDURE — 3008F BODY MASS INDEX DOCD: CPT | Performed by: INTERNAL MEDICINE

## 2025-08-01 PROCEDURE — 86780 TREPONEMA PALLIDUM: CPT

## 2025-08-01 PROCEDURE — 87591 N.GONORRHOEAE DNA AMP PROB: CPT

## 2025-08-01 PROCEDURE — 87491 CHLMYD TRACH DNA AMP PROBE: CPT

## 2025-08-01 ASSESSMENT — PAIN SCALES - GENERAL: PAINLEVEL_OUTOF10: 0-NO PAIN

## 2025-08-01 NOTE — PROGRESS NOTES
"OUTPATIENT FOLLOW-UP -  VASCULAR MEDICINE    DOS: 8/1/25  Last seen:   8/2/24     REQUESTING PHYSICIAN:  Dr. Sharon Villalpando    REASON FOR FOLLOW-UP:  here for follow up CVI and h/o ulcer    HISTORY OF PRESENT ILLNESS:     45 yo man here for follow up CVI and h/o ulcer. Doing well. Swelling is well maintained. Continues to gain weight - up 15# since last seen. Using the 20-30 mmHg stocking. Reports still exercising. Riding a stationary bike. No intervening sores or ulcers.     REVIEW OF SYSTEMS:     weight is increased  No sores, ulcers, rashes, skin lesions  No BRBPR, melena, hematuria  No bleeding  No edema, no calf pain    PHYSICAL EXAMINATION:   BP 98/62 (BP Location: Left arm, Patient Position: Sitting)   Pulse 93   Resp 16   Ht 1.88 m (6' 2\")   Wt (!) 164 kg (361 lb)   BMI 46.35 kg/m²     Gen: Appears well, NAD  Ext: no edema, nontender  Skin: good condition without wounds or lesions  Pulses: DP 2+; PT 2+  Mood and affect appropriate    ADDITIONAL DATA:   20-30mmHg compression worn  right calf:  45.0cm  left calf:  43.5cm     ASSESSMENT/PLAN:    here for follow up CVI and h/o ulcer - continue the skin care. Continue the compression. Increase exercise as able. Discussed the need to work on the weightloss. Follow up annually.   "

## 2025-08-01 NOTE — PATIENT INSTRUCTIONS
ASSESSMENT/PLAN:    here for follow up CVI and h/o ulcer - continue the skin care. Continue the compression. Increase exercise as able. Discussed the need to work on the weightloss. Follow up annually.

## 2025-08-02 LAB
C TRACH RRNA SPEC QL NAA+PROBE: NEGATIVE
HOLD SPECIMEN: NORMAL
N GONORRHOEA DNA SPEC QL PROBE+SIG AMP: NEGATIVE

## 2025-08-13 ENCOUNTER — HOSPITAL ENCOUNTER (OUTPATIENT)
Dept: RADIOLOGY | Facility: HOSPITAL | Age: 45
Discharge: HOME | End: 2025-08-13
Payer: MEDICARE

## 2025-08-13 DIAGNOSIS — K74.69 OTHER CIRRHOSIS OF LIVER: ICD-10-CM

## 2025-08-13 PROCEDURE — 93975 VASCULAR STUDY: CPT

## 2025-08-13 PROCEDURE — 93975 VASCULAR STUDY: CPT | Performed by: RADIOLOGY

## 2025-08-13 PROCEDURE — 76705 ECHO EXAM OF ABDOMEN: CPT | Performed by: RADIOLOGY

## (undated) DEVICE — BOWL, UTILITY, 32 OZ, PLASTIC, STERILE

## (undated) DEVICE — Device

## (undated) DEVICE — SPONGE, GAUZE, XRAY DECT, 16 PLY, 4 X 4, W/MASTER DMT,STERILE

## (undated) DEVICE — BAG, DECANTER

## (undated) DEVICE — COVER, CART, 45 X 27 X 48 IN, CLEAR

## (undated) DEVICE — REST, HEAD, BAGEL, 9 IN

## (undated) DEVICE — MANIFOLD, 4 PORT NEPTUNE STANDARD

## (undated) DEVICE — FLOSEAL, MATRIX, HEMOSTATIC, FULL STERILE PREP, 5ML

## (undated) DEVICE — SPONGE, LAP, XRAY DECT, SC+RFID, 12X12, STERILE